# Patient Record
Sex: FEMALE | Race: WHITE | NOT HISPANIC OR LATINO | URBAN - METROPOLITAN AREA
[De-identification: names, ages, dates, MRNs, and addresses within clinical notes are randomized per-mention and may not be internally consistent; named-entity substitution may affect disease eponyms.]

---

## 2020-12-26 ENCOUNTER — INPATIENT (INPATIENT)
Facility: HOSPITAL | Age: 70
LOS: 4 days | Discharge: HOME HEALTH | End: 2020-12-31
Attending: INTERNAL MEDICINE | Admitting: INTERNAL MEDICINE
Payer: MEDICARE

## 2020-12-26 VITALS
SYSTOLIC BLOOD PRESSURE: 194 MMHG | RESPIRATION RATE: 18 BRPM | TEMPERATURE: 98 F | HEART RATE: 156 BPM | DIASTOLIC BLOOD PRESSURE: 100 MMHG | OXYGEN SATURATION: 99 %

## 2020-12-26 DIAGNOSIS — Z98.890 OTHER SPECIFIED POSTPROCEDURAL STATES: Chronic | ICD-10-CM

## 2020-12-26 LAB
ALBUMIN SERPL ELPH-MCNC: 4 G/DL — SIGNIFICANT CHANGE UP (ref 3.5–5.2)
ALP SERPL-CCNC: 75 U/L — SIGNIFICANT CHANGE UP (ref 30–115)
ALT FLD-CCNC: 10 U/L — SIGNIFICANT CHANGE UP (ref 0–41)
ANION GAP SERPL CALC-SCNC: 10 MMOL/L — SIGNIFICANT CHANGE UP (ref 7–14)
AST SERPL-CCNC: 12 U/L — SIGNIFICANT CHANGE UP (ref 0–41)
BASOPHILS # BLD AUTO: 0.06 K/UL — SIGNIFICANT CHANGE UP (ref 0–0.2)
BASOPHILS NFR BLD AUTO: 0.8 % — SIGNIFICANT CHANGE UP (ref 0–1)
BILIRUB SERPL-MCNC: 0.3 MG/DL — SIGNIFICANT CHANGE UP (ref 0.2–1.2)
BUN SERPL-MCNC: 12 MG/DL — SIGNIFICANT CHANGE UP (ref 10–20)
CALCIUM SERPL-MCNC: 10.5 MG/DL — HIGH (ref 8.5–10.1)
CHLORIDE SERPL-SCNC: 101 MMOL/L — SIGNIFICANT CHANGE UP (ref 98–110)
CO2 SERPL-SCNC: 27 MMOL/L — SIGNIFICANT CHANGE UP (ref 17–32)
CREAT SERPL-MCNC: 0.7 MG/DL — SIGNIFICANT CHANGE UP (ref 0.7–1.5)
EOSINOPHIL # BLD AUTO: 0.08 K/UL — SIGNIFICANT CHANGE UP (ref 0–0.7)
EOSINOPHIL NFR BLD AUTO: 1.1 % — SIGNIFICANT CHANGE UP (ref 0–8)
GLUCOSE SERPL-MCNC: 132 MG/DL — HIGH (ref 70–99)
HCT VFR BLD CALC: 44.6 % — SIGNIFICANT CHANGE UP (ref 37–47)
HGB BLD-MCNC: 14.1 G/DL — SIGNIFICANT CHANGE UP (ref 12–16)
IMM GRANULOCYTES NFR BLD AUTO: 0.3 % — SIGNIFICANT CHANGE UP (ref 0.1–0.3)
LYMPHOCYTES # BLD AUTO: 1.37 K/UL — SIGNIFICANT CHANGE UP (ref 1.2–3.4)
LYMPHOCYTES # BLD AUTO: 18.5 % — LOW (ref 20.5–51.1)
MAGNESIUM SERPL-MCNC: 1.8 MG/DL — SIGNIFICANT CHANGE UP (ref 1.8–2.4)
MCHC RBC-ENTMCNC: 27.8 PG — SIGNIFICANT CHANGE UP (ref 27–31)
MCHC RBC-ENTMCNC: 31.6 G/DL — LOW (ref 32–37)
MCV RBC AUTO: 88 FL — SIGNIFICANT CHANGE UP (ref 81–99)
MONOCYTES # BLD AUTO: 0.63 K/UL — HIGH (ref 0.1–0.6)
MONOCYTES NFR BLD AUTO: 8.5 % — SIGNIFICANT CHANGE UP (ref 1.7–9.3)
NEUTROPHILS # BLD AUTO: 5.25 K/UL — SIGNIFICANT CHANGE UP (ref 1.4–6.5)
NEUTROPHILS NFR BLD AUTO: 70.8 % — SIGNIFICANT CHANGE UP (ref 42.2–75.2)
NRBC # BLD: 0 /100 WBCS — SIGNIFICANT CHANGE UP (ref 0–0)
NT-PROBNP SERPL-SCNC: 4388 PG/ML — HIGH (ref 0–300)
PLATELET # BLD AUTO: 288 K/UL — SIGNIFICANT CHANGE UP (ref 130–400)
POTASSIUM SERPL-MCNC: 4.7 MMOL/L — SIGNIFICANT CHANGE UP (ref 3.5–5)
POTASSIUM SERPL-SCNC: 4.7 MMOL/L — SIGNIFICANT CHANGE UP (ref 3.5–5)
PROT SERPL-MCNC: 6.6 G/DL — SIGNIFICANT CHANGE UP (ref 6–8)
RBC # BLD: 5.07 M/UL — SIGNIFICANT CHANGE UP (ref 4.2–5.4)
RBC # FLD: 13.3 % — SIGNIFICANT CHANGE UP (ref 11.5–14.5)
SARS-COV-2 RNA SPEC QL NAA+PROBE: SIGNIFICANT CHANGE UP
SODIUM SERPL-SCNC: 138 MMOL/L — SIGNIFICANT CHANGE UP (ref 135–146)
TROPONIN T SERPL-MCNC: <0.01 NG/ML — SIGNIFICANT CHANGE UP
WBC # BLD: 7.41 K/UL — SIGNIFICANT CHANGE UP (ref 4.8–10.8)
WBC # FLD AUTO: 7.41 K/UL — SIGNIFICANT CHANGE UP (ref 4.8–10.8)

## 2020-12-26 PROCEDURE — 71045 X-RAY EXAM CHEST 1 VIEW: CPT | Mod: 26

## 2020-12-26 PROCEDURE — 99291 CRITICAL CARE FIRST HOUR: CPT | Mod: CS,GC

## 2020-12-26 PROCEDURE — 93010 ELECTROCARDIOGRAM REPORT: CPT

## 2020-12-26 RX ORDER — DILTIAZEM HCL 120 MG
60 CAPSULE, EXT RELEASE 24 HR ORAL ONCE
Refills: 0 | Status: COMPLETED | OUTPATIENT
Start: 2020-12-26 | End: 2020-12-26

## 2020-12-26 RX ORDER — CHLORHEXIDINE GLUCONATE 213 G/1000ML
1 SOLUTION TOPICAL
Refills: 0 | Status: DISCONTINUED | OUTPATIENT
Start: 2020-12-26 | End: 2020-12-31

## 2020-12-26 RX ORDER — DILTIAZEM HCL 120 MG
20 CAPSULE, EXT RELEASE 24 HR ORAL ONCE
Refills: 0 | Status: COMPLETED | OUTPATIENT
Start: 2020-12-26 | End: 2020-12-26

## 2020-12-26 RX ORDER — SODIUM CHLORIDE 9 MG/ML
1000 INJECTION INTRAMUSCULAR; INTRAVENOUS; SUBCUTANEOUS
Refills: 0 | Status: DISCONTINUED | OUTPATIENT
Start: 2020-12-26 | End: 2020-12-28

## 2020-12-26 RX ORDER — DILTIAZEM HCL 120 MG
10 CAPSULE, EXT RELEASE 24 HR ORAL
Qty: 125 | Refills: 0 | Status: DISCONTINUED | OUTPATIENT
Start: 2020-12-26 | End: 2020-12-27

## 2020-12-26 RX ORDER — DILTIAZEM HCL 120 MG
5 CAPSULE, EXT RELEASE 24 HR ORAL
Qty: 125 | Refills: 0 | Status: DISCONTINUED | OUTPATIENT
Start: 2020-12-26 | End: 2020-12-26

## 2020-12-26 RX ORDER — ENOXAPARIN SODIUM 100 MG/ML
40 INJECTION SUBCUTANEOUS DAILY
Refills: 0 | Status: DISCONTINUED | OUTPATIENT
Start: 2020-12-26 | End: 2020-12-27

## 2020-12-26 RX ORDER — INFLUENZA VIRUS VACCINE 15; 15; 15; 15 UG/.5ML; UG/.5ML; UG/.5ML; UG/.5ML
0.5 SUSPENSION INTRAMUSCULAR ONCE
Refills: 0 | Status: COMPLETED | OUTPATIENT
Start: 2020-12-26 | End: 2020-12-26

## 2020-12-26 RX ADMIN — Medication 5 MG/HR: at 15:10

## 2020-12-26 RX ADMIN — Medication 60 MILLIGRAM(S): at 13:33

## 2020-12-26 RX ADMIN — Medication 20 MILLIGRAM(S): at 13:05

## 2020-12-26 RX ADMIN — SODIUM CHLORIDE 75 MILLILITER(S): 9 INJECTION INTRAMUSCULAR; INTRAVENOUS; SUBCUTANEOUS at 23:22

## 2020-12-26 RX ADMIN — Medication 10 MG/HR: at 22:52

## 2020-12-26 RX ADMIN — Medication 20 MILLIGRAM(S): at 15:09

## 2020-12-26 NOTE — ED PROVIDER NOTE - OBJECTIVE STATEMENT
71 y/o F no reported pmh [place] [facility] [place] R[place]. + non bloody, intermittent, daily diarrhea x 2wks. + intermittent palpitations x past few days. went to [facility] [place]. No cp, pain, vomiting, fever. + hx prior episode afib rvr w/ electrocardioversion years ago, no ac or other meds rx'ed.

## 2020-12-26 NOTE — H&P ADULT - ASSESSMENT
71y/o F with PMH of Afib s/p ablation presents to ED with palpitations found to be in Afib.    #Atrial fibrillation with RVR  - on cardizem @ 5  - CHADSVASC 1, no therapeutic coag  - check TSH  - EP eval     #Chronic diarrhea  -     #MISC  - DVT ppx: lovenox  - GI ppx: not indicated  - Activity: as tolerated  - Diet: regular  - Dispo: acute 71y/o F with PMH of Afib s/p ablation presents to ED with palpitations found to be in Afib.    #Atrial fibrillation with RVR  - on cardizem @ 5  - CHADSVASC 1, no therapeutic coag  - check TSH  - EP eval     #diarrhea >1mo  - resolves with constant use of pepto-bismol  - GI PCR, GI culture/pH  - GI eval    #MISC  - DVT ppx: lovenox  - GI ppx: not indicated  - Activity: as tolerated  - Diet: regular  - Dispo: acute

## 2020-12-26 NOTE — ED ADULT NURSE NOTE - OBJECTIVE STATEMENT
Patient presents to ED with complaints of palpitations. Patient reports having diarrhea for last 2 weeks and started having palpitations a few days ago. Patient went to an urgent care today where they found her to be in Afib RVR. Patient denies CP, SOB, cough, fever, any sick contacts. Patient got Rapid COVID test at urgent care which was negative.

## 2020-12-26 NOTE — H&P ADULT - HISTORY OF PRESENT ILLNESS
69y/o F with PMH of Afib with hx of ablation not on AC presents with palpitations and diarrhea. Pt admits to having daily diarrhea for >2wks and some intermittent palpitations for past few days. She went to Urgent Care for diarrhea and was found to be in Afib with RVR. No CP, SOB, N/V, abd pain. Not on any other meds.    In ED: T98,  > 84, /88, RR16, SpO2 96%.   EKG Afib with RVR. Was given IV cardizem pushes w/o improvement, placed on cardizem drip.  Labs only remarkable for BNP 4388. COVID neg.  69y/o F with PMH of Afib with hx of ablation not on AC presents with palpitations and diarrhea. Pt admits to having daily diarrhea for >2wks and on questioning thinks she had some intermittent palpitations for past few days. She went to Urgent Care for diarrhea and was found incidentally to be in Afib with RVR. No CP, SOB, N/V, abd pain. Not on any other meds.     In ED: T98,  > 84, /88, RR16, SpO2 96%.   EKG Afib with RVR. Was given IV cardizem pushes w/o improvement, placed on cardizem drip.  Labs only remarkable for BNP 4388. COVID neg.  71y/o F with PMH of Afib with hx of ablation not on AC presents with palpitations and diarrhea. Pt admits to having daily diarrhea for >3wks and on questioning thinks she had some intermittent palpitations for past few days. She takes pepto-bismol "all day" to prevent the diarrhea - tried imodium but w/o success. She went to Urgent Care for diarrhea and was found incidentally to be in Afib with RVR. No CP, SOB, N/V, abd pain. Not on any other meds.     In ED: T98,  > 84, /88, RR16, SpO2 96%.   EKG Afib with RVR. Was given IV cardizem pushes w/o improvement, placed on cardizem drip.  Labs only remarkable for BNP 4388. COVID neg.

## 2020-12-26 NOTE — PATIENT PROFILE ADULT - NSPROPTRIGHTSUPPORTPERSON_GEN_A_NUR
Per Dr. Ho, patient can be added in earlier. Called patient and scheduled her for 9/23/19, advised her to bring in her discharge paperwork. Patient verbalized understanding.   yes

## 2020-12-26 NOTE — H&P ADULT - NSHPPHYSICALEXAM_GEN_ALL_CORE
GENERAL: NAD, well-developed  HEENT:  Atraumatic, Normocephalic; EOMI, PERRLA, conjunctiva pink, sclera white, Supple, No JVD, thyromegally or LYAD appreciated  CHEST/LUNG: Clear to auscultation bilaterally; No wheeze, ronchi or rales  HEART: Regular rate and rhythm; No murmurs, rubs, or gallops  ABDOMEN: Soft, Nontender, Nondistended; Bowel sounds present  EXTREMITIES:  2+ Peripheral Pulses, No peripheral pitting edema  PSYCH: AAOx3  NEUROLOGY: non-focal  SKIN: No rashes/lesions appreciated GENERAL: NAD, well-developed F comfortable in bed  HEENT:  Atraumatic, Normocephalic; EOMI, PERRLA, conjunctiva pink, sclera white, Supple, dry oral mucosa  CHEST/LUNG: Clear to auscultation bilaterally; No wheeze  HEART: irregular rhythm, rate ~110; No murmurs  ABDOMEN: Soft, Nontender, Nondistended; Bowel sounds normoactive  EXTREMITIES:  2+ Peripheral Pulses, No peripheral pitting edema  PSYCH: AAOx3  NEUROLOGY: non-focal  SKIN: No rashes/lesions appreciated

## 2020-12-26 NOTE — ED PROVIDER NOTE - NS ED ROS FT
Constitutional:  No fever  Eyes:  No visual changes  ENMT: No neck pain or stiffness  Cardiac:  No chest pain; See HPI  Respiratory:  No cough or respiratory distress.   GI:  See HPI  :  No dysuria, frequency or burning.  MS:  No back pain.  Neuro:  No headache   Skin:  No skin rash  Except as documented in the HPI,  all other systems are negative

## 2020-12-26 NOTE — ED PROVIDER NOTE - ATTENDING CONTRIBUTION TO CARE
.  IMP: afib RVR, diarrhea  [place] [facility] [place]  P: CCBlocker rate control, ivf, cxr, ekg, labs, reassess, plan to admit.

## 2020-12-26 NOTE — ED PROVIDER NOTE - CLINICAL SUMMARY MEDICAL DECISION MAKING FREE TEXT BOX
Pt w/ AFIB RVR and diarrhea. Labs reviewed. Rate controlled w/ CCB. pt got IVF. EKG Aflutter. Pt admitted to tele for cardiac monitoring, cont IV rate control, specialist consult.

## 2020-12-26 NOTE — ED PROVIDER NOTE - PHYSICAL EXAMINATION
CONSTITUTIONAL: NAD  SKIN: Warm dry  HEAD: NCAT  EYES: NL inspection  ENT: MMM  NECK: Supple; non tender.  CARD: irreg irreg tachycardia 160-170's  RESP: CTAB  ABD: S/NT no R/G  EXT: no pedal edema  NEURO: Grossly unremarkable  PSYCH: Cooperative, appropriate.

## 2020-12-27 LAB
ANION GAP SERPL CALC-SCNC: 10 MMOL/L — SIGNIFICANT CHANGE UP (ref 7–14)
BASOPHILS # BLD AUTO: 0.06 K/UL — SIGNIFICANT CHANGE UP (ref 0–0.2)
BASOPHILS NFR BLD AUTO: 0.9 % — SIGNIFICANT CHANGE UP (ref 0–1)
BUN SERPL-MCNC: 9 MG/DL — LOW (ref 10–20)
CALCIUM SERPL-MCNC: 10 MG/DL — SIGNIFICANT CHANGE UP (ref 8.5–10.1)
CHLORIDE SERPL-SCNC: 103 MMOL/L — SIGNIFICANT CHANGE UP (ref 98–110)
CO2 SERPL-SCNC: 25 MMOL/L — SIGNIFICANT CHANGE UP (ref 17–32)
CREAT SERPL-MCNC: 0.5 MG/DL — LOW (ref 0.7–1.5)
EOSINOPHIL # BLD AUTO: 0.21 K/UL — SIGNIFICANT CHANGE UP (ref 0–0.7)
EOSINOPHIL NFR BLD AUTO: 3 % — SIGNIFICANT CHANGE UP (ref 0–8)
GLUCOSE SERPL-MCNC: 128 MG/DL — HIGH (ref 70–99)
HCT VFR BLD CALC: 42 % — SIGNIFICANT CHANGE UP (ref 37–47)
HGB BLD-MCNC: 12.8 G/DL — SIGNIFICANT CHANGE UP (ref 12–16)
IMM GRANULOCYTES NFR BLD AUTO: 0.3 % — SIGNIFICANT CHANGE UP (ref 0.1–0.3)
LYMPHOCYTES # BLD AUTO: 1.5 K/UL — SIGNIFICANT CHANGE UP (ref 1.2–3.4)
LYMPHOCYTES # BLD AUTO: 21.3 % — SIGNIFICANT CHANGE UP (ref 20.5–51.1)
MAGNESIUM SERPL-MCNC: 1.9 MG/DL — SIGNIFICANT CHANGE UP (ref 1.8–2.4)
MCHC RBC-ENTMCNC: 26.9 PG — LOW (ref 27–31)
MCHC RBC-ENTMCNC: 30.5 G/DL — LOW (ref 32–37)
MCV RBC AUTO: 88.4 FL — SIGNIFICANT CHANGE UP (ref 81–99)
MONOCYTES # BLD AUTO: 0.7 K/UL — HIGH (ref 0.1–0.6)
MONOCYTES NFR BLD AUTO: 9.9 % — HIGH (ref 1.7–9.3)
NEUTROPHILS # BLD AUTO: 4.56 K/UL — SIGNIFICANT CHANGE UP (ref 1.4–6.5)
NEUTROPHILS NFR BLD AUTO: 64.6 % — SIGNIFICANT CHANGE UP (ref 42.2–75.2)
NRBC # BLD: 0 /100 WBCS — SIGNIFICANT CHANGE UP (ref 0–0)
PLATELET # BLD AUTO: 271 K/UL — SIGNIFICANT CHANGE UP (ref 130–400)
POTASSIUM SERPL-MCNC: 4.8 MMOL/L — SIGNIFICANT CHANGE UP (ref 3.5–5)
POTASSIUM SERPL-SCNC: 4.8 MMOL/L — SIGNIFICANT CHANGE UP (ref 3.5–5)
RBC # BLD: 4.75 M/UL — SIGNIFICANT CHANGE UP (ref 4.2–5.4)
RBC # FLD: 13.2 % — SIGNIFICANT CHANGE UP (ref 11.5–14.5)
SODIUM SERPL-SCNC: 138 MMOL/L — SIGNIFICANT CHANGE UP (ref 135–146)
WBC # BLD: 7.05 K/UL — SIGNIFICANT CHANGE UP (ref 4.8–10.8)
WBC # FLD AUTO: 7.05 K/UL — SIGNIFICANT CHANGE UP (ref 4.8–10.8)

## 2020-12-27 PROCEDURE — 99223 1ST HOSP IP/OBS HIGH 75: CPT

## 2020-12-27 PROCEDURE — 93306 TTE W/DOPPLER COMPLETE: CPT | Mod: 26

## 2020-12-27 PROCEDURE — 99233 SBSQ HOSP IP/OBS HIGH 50: CPT

## 2020-12-27 PROCEDURE — 99222 1ST HOSP IP/OBS MODERATE 55: CPT | Mod: GC

## 2020-12-27 PROCEDURE — 93291 INTERROG DEV EVAL SCRMS IP: CPT | Mod: 26

## 2020-12-27 RX ORDER — DILTIAZEM HCL 120 MG
60 CAPSULE, EXT RELEASE 24 HR ORAL EVERY 6 HOURS
Refills: 0 | Status: DISCONTINUED | OUTPATIENT
Start: 2020-12-27 | End: 2020-12-30

## 2020-12-27 RX ORDER — APIXABAN 2.5 MG/1
5 TABLET, FILM COATED ORAL EVERY 12 HOURS
Refills: 0 | Status: DISCONTINUED | OUTPATIENT
Start: 2020-12-27 | End: 2020-12-29

## 2020-12-27 RX ADMIN — APIXABAN 5 MILLIGRAM(S): 2.5 TABLET, FILM COATED ORAL at 12:28

## 2020-12-27 RX ADMIN — APIXABAN 5 MILLIGRAM(S): 2.5 TABLET, FILM COATED ORAL at 23:45

## 2020-12-27 RX ADMIN — SODIUM CHLORIDE 100 MILLILITER(S): 9 INJECTION INTRAMUSCULAR; INTRAVENOUS; SUBCUTANEOUS at 12:28

## 2020-12-27 RX ADMIN — Medication 60 MILLIGRAM(S): at 23:44

## 2020-12-27 RX ADMIN — Medication 60 MILLIGRAM(S): at 11:00

## 2020-12-27 RX ADMIN — Medication 60 MILLIGRAM(S): at 17:23

## 2020-12-27 RX ADMIN — CHLORHEXIDINE GLUCONATE 1 APPLICATION(S): 213 SOLUTION TOPICAL at 05:11

## 2020-12-27 NOTE — CONSULT NOTE ADULT - ASSESSMENT
71yo Female with PAF, DCCV in the past admitted with AF RVR and diarrhea  diarrhea resolved   AF onset likely due to dehydration in the setting of persistent diarrhea     PAF  [id number] Score is 1    con't tele  will plan DCCV for tomorrow  NPO after MN  start Eliquis 5mg bid, will need therapeutic AC after DCCV despite low score  change Diltiazem drip to 60mg PO q6h, with plan to change to long acting on discharge  Check TSH / Free T4   check Lyme titers   Echocardiogram  PO and IV rehydration  will follow    D/w Dr Watt

## 2020-12-27 NOTE — PROGRESS NOTE ADULT - SUBJECTIVE AND OBJECTIVE BOX
Pt was seen and examined. Pt resting comfortably in bed. no complaints at this time.    ROS: All negative except as noted above    PAST MEDICAL & SURGICAL HISTORY:  Afib    Allergies    No Known Allergies    Intolerances    MEDICATIONS  (STANDING):  apixaban 5 milliGRAM(s) Oral every 12 hours  chlorhexidine 4% Liquid 1 Application(s) Topical <User Schedule>  diltiazem    Tablet 60 milliGRAM(s) Oral every 6 hours  sodium chloride 0.9%. 1000 milliLiter(s) (100 mL/Hr) IV Continuous <Continuous>    MEDICATIONS  (PRN):    Vital Signs Last 24 Hrs  T(C): 36.7 (27 Dec 2020 20:06), Max: 36.7 (27 Dec 2020 20:06)  T(F): 98.1 (27 Dec 2020 20:06), Max: 98.1 (27 Dec 2020 20:06)  HR: 122 (27 Dec 2020 20:06) (77 - 122)  BP: 111/79 (27 Dec 2020 20:06) (106/69 - 137/84)  BP(mean): --  RR: 18 (27 Dec 2020 20:06) (18 - 19)  SpO2: 95% (27 Dec 2020 19:52) (90% - 95%)    Physical Exam:  Constitutional: Not in acute distress  SKIN: No rashes or lesions  HEENT: Atraumatic. Normocephalic. Anicteric  NECK:  No JVD.   PULMONARY: Clear Auscultation bilateraly. non labored respirations.   Cardiovascular: Normal S1, S2. Irregular rhythm. No murmurs.  ABDOMEN: Soft, Nontender, Nondistended; Bowel sounds are present  EXTREMITIES:  Non edematous, non cyanotic  NEUROLOGIC:  Alert & oriented x 3, No motor deficit.  PSYCH: normal affect    Labs:  CBC                        12.8   7.05  )-----------( 271      ( 27 Dec 2020 06:15 )             42.0   CMP  12-27    138  |  103  |  9<L>  ----------------------------<  128<H>  4.8   |  25  |  0.5<L>    Ca    10.0      27 Dec 2020 06:15  Mg     1.9     12-27    TPro  6.6  /  Alb  4.0  /  TBili  0.3  /  DBili  x   /  AST  12  /  ALT  10  /  AlkPhos  75  12-26    < from: Xray Chest 1 View-PORTABLE IMMEDIATE (12.26.20 @ 14:35) >  Impression:    1. Low lung volumes with small bibasilar opacities and/or pleural effusions.EXAM:  XR CHEST PORTABLE IMMED 1V            PROCEDURE DATE:  12/26/2020      < end of copied text >

## 2020-12-27 NOTE — CONSULT NOTE ADULT - ASSESSMENT
71y/o F with PMH of Afib with hx of ablation not on AC presents with palpitations and diarrhea. GI consulted for diarrhea. Non bloody , non mucoid , of two weeks duration , second episode sine January  this year . No abdominal pain , no weight loss , no GI bleed     #acute diarrhea ( two weeks in duration) without alarm symptoms   Diarrhea is resolved   No anemia     REC:  Stool studies for C.DIFF and GI PCR , lactoferrin and calprotectin if diarrhea reoccur   Lactose free diet   Out patient colonoscopy   please recall GI for any question or concern

## 2020-12-27 NOTE — CONSULT NOTE ADULT - SUBJECTIVE AND OBJECTIVE BOX
Gastroenterology Consultation:    Patient is a 70y old  Female who presents with a chief complaint of Afib (27 Dec 2020 09:31)      Admitted on: 12-26-20  HPI:  71y/o F with PMH of Afib with hx of ablation not on AC presents with palpitations and diarrhea. Pt admits to having daily diarrhea for 2 weeks , two to three episodes per  day , non bloody , non mucoid , with no abdominal pain , no fever or chills ,   and on questioning thinks she had some intermittent palpitations for past few days. She takes pepto-bismol "all day" to prevent the diarrhea - tried imodium but w/o success. She went to Urgent Care for diarrhea and was found incidentally to be in Afib with RVR. No CP, SOB, N/V, abd pain. Not on any other meds. Patient had one week of diarrhea back in January last year ad resolved on its own     In ED: T98,  > 84, /88, RR16, SpO2 96%.   EKG Afib with RVR. Was given IV Cardizem pushes w/o improvement, placed on Cardizem drip.  Labs only remarkable for BNP 4388. COVID neg.  (26 Dec 2020 16:12)      Prior records Reviewed (Y/N): Y  History obtained from person other than patient (Y/N): Y    Prior EGD: None in chart   Prior Colonoscopy: more than 5 years ago       PAST MEDICAL & SURGICAL HISTORY:  Afib        FAMILY HISTORY:  FH: peptic ulcer  mother        Social History:  Tobacco: denies   Alcohol: denies   Drugs: denies     Home Medications:    MEDICATIONS  (STANDING):  apixaban 5 milliGRAM(s) Oral every 12 hours  chlorhexidine 4% Liquid 1 Application(s) Topical <User Schedule>  diltiazem    Tablet 60 milliGRAM(s) Oral every 6 hours  diltiazem Infusion 10 mG/Hr (10 mL/Hr) IV Continuous <Continuous>  sodium chloride 0.9%. 1000 milliLiter(s) (100 mL/Hr) IV Continuous <Continuous>    MEDICATIONS  (PRN):      Allergies  No Known Allergies      Review of Systems:   Constitutional:  No Fever, No Chills  ENT/Mouth:  No Hearing Changes,  No Difficulty Swallowing  Eyes:  No Eye Pain, No Vision Changes  Cardiovascular:  No Chest Pain, No Palpitations now   Respiratory:  No Cough, No Dyspnea  Gastrointestinal:  As described in HPI  Musculoskeletal:  No Joint Swelling, No Back Pain  Skin:  No Skin Lesions, No Jaundice  Neuro:  No Syncope, No Dizziness  Heme/Lymph:  No Bruising, No Bleeding.          Physical Examination:  T(C): 35.9 (12-27-20 @ 12:32), Max: 37 (12-26-20 @ 18:49)  HR: 88 (12-27-20 @ 12:32) (77 - 120)  BP: 123/85 (12-27-20 @ 12:32) (106/69 - 167/88)  RR: 18 (12-27-20 @ 12:32) (16 - 19)  SpO2: 94% (12-27-20 @ 07:59) (92% - 96%)    Weight (kg): 85.5 (12-27-20 @ 10:00)    12-26-20 @ 07:01  -  12-27-20 @ 07:00  --------------------------------------------------------  IN: 1210 mL / OUT: 0 mL / NET: 1210 mL    12-27-20 @ 07:01  -  12-27-20 @ 14:26  --------------------------------------------------------  IN: 480 mL / OUT: 0 mL / NET: 480 mL        Constitutional: No acute distress.  Eyes:. Conjunctivae are clear, Sclera is non-icteric.  Ears Nose and Throat: The external ears are normal appearing,  Oral mucosa is pink and moist.  Respiratory:  No signs of respiratory distress. Lung sounds are clear bilaterally.  Cardiovascular:  S1 S2, Regular rate and rhythm.  GI: Abdomen is soft, symmetric, and non-tender without distention. Bowel sounds are present and normoactive in all four quadrants. No masses, hepatomegaly, or splenomegaly are noted.   Neuro: No Tremor, No involuntary movements  Skin: No rashes, No Jaundice.          Data: (reviewed by attending)                        12.8   7.05  )-----------( 271      ( 27 Dec 2020 06:15 )             42.0     Hgb Trend:  12.8  12-27-20 @ 06:15  14.1  12-26-20 @ 12:56        12-27    138  |  103  |  9<L>  ----------------------------<  128<H>  4.8   |  25  |  0.5<L>    Ca    10.0      27 Dec 2020 06:15  Mg     1.9     12-27    TPro  6.6  /  Alb  4.0  /  TBili  0.3  /  DBili  x   /  AST  12  /  ALT  10  /  AlkPhos  75  12-26    Liver panel trend:  TBili 0.3   /   AST 12   /   ALT 10   /   AlkP 75   /   Tptn 6.6   /   Alb 4.0    /   DBili --      12-26              Radiology:(reviewed by attending)

## 2020-12-27 NOTE — PROGRESS NOTE ADULT - ASSESSMENT
Ms Herndon is a 71yo Woman with medical history of Afib s/p ablation presents to ED with palpitations found to be in Afib.    #Atrial fibrillation with RVR  CHADSVASc of 2 - High Risk  f/u TSH and free T4  f/u Echo  f/u lyme titers?  EP eval noted - DCCV david   started Eliquis 5mg q12  started Cardizem 60mg q6      #diarrhea >1mo  resolves with constant use of pepto-bismol  GI PCR, GI culture/pH  GI eval  f/u TSH     Dispo: Acute   Full Code

## 2020-12-27 NOTE — CONSULT NOTE ADULT - SUBJECTIVE AND OBJECTIVE BOX
Patient is a 70y old  Female who presents with a chief complaint of Afib (26 Dec 2020 16:12)        HPI:  71y/o F with PMH of Afib with hx of ablation not on AC presents with palpitations and diarrhea. Pt admits to having daily diarrhea for >3wks and on questioning thinks she had some intermittent palpitations for past few days. She takes pepto-bismol "all day" to prevent the diarrhea - tried imodium but w/o success. She went to Urgent Care for diarrhea and was found incidentally to be in Afib with RVR. No CP, SOB, N/V, abd pain. Not on any other meds.     In ED: T98,  > 84, /88, RR16, SpO2 96%.   EKG Afib with RVR. Was given IV cardizem pushes w/o improvement, placed on cardizem drip.  Labs only remarkable for BNP 4388. COVID neg.  (26 Dec 2020 16:12)      Electrophysiology:  70y Female with family h/o CAD and AF, with h/o one known episode of AF 3/2018 in the setting of PNA, s/p DCCV at that time. Implantable loop recorder was placed then, however patient did not follow up with monitoring. Patient was in usual state of health, moved to  ~6mos ago. Since 2 weeks ago she developed persistent watery diarrhea improved with pepto-bismos, recur once pepto is stopped. She presented to [facility] with above complaint and was found to be in AF RVR 160s bpm and sent to ED. In ED she received Diltiazem 10mg IVP followed by drip, @10mg at this time. Patient report occasional palpitations/"hearth thumping"   over the last two weeks, denies dyspnea, dizziness, chest pain, LOC, reports generalized weakness. No diarrhea or any BP since admission  ILR was interrogated. Since last interrogation 3/20/2018 patient had several episodes of AF with RVR in July and 2020 lasting several days, This episode is ongoing since 2020.  -170s bpm  Patient does not recall any special symptoms or stressors at that times, no recent travels or new medications. No cardiac evals in the past      REVIEW OF SYSTEMS    [x ] A ten-point review of systems was otherwise negative except as noted.  [ ] Due to altered mental status/intubation, subjective information were not able to be obtained from the patient. History was obtained, to the extent possible, from review of the chart and collateral sources of information.      PAST MEDICAL & SURGICAL HISTORY:  Afib    DCCV        Home Medications:  NONE    Allergies:  No Known Allergies      FAMILY HISTORY:  FH: peptic ulcer  mother - AF, CAD  father - CAD, MI           SOCIAL HISTORY: denies tobacco / ETOH / illicit drug use      CIGARETTES:  ALCOHOL:        PREVIOUS DIAGNOSTIC TESTING:  none available          MEDICATIONS  (STANDING):  apixaban 5 milliGRAM(s) Oral every 12 hours  chlorhexidine 4% Liquid 1 Application(s) Topical <User Schedule>  diltiazem    Tablet 60 milliGRAM(s) Oral every 6 hours  diltiazem Infusion 10 mG/Hr (10 mL/Hr) IV Continuous <Continuous>  sodium chloride 0.9%. 1000 milliLiter(s) (100 mL/Hr) IV Continuous <Continuous>    MEDICATIONS  (PRN):      Vital Signs Last 24 Hrs  T(C): 35.7 (27 Dec 2020 06:30), Max: 37 (26 Dec 2020 18:49)  T(F): 96.3 (27 Dec 2020 06:30), Max: 98.6 (26 Dec 2020 18:49)  HR: 102 (27 Dec 2020 10:00) (77 - 160)  BP: 123/83 (27 Dec 2020 10:00) (106/69 - 194/100)  BP(mean): --  RR: 19 (27 Dec 2020 06:30) (16 - 19)  SpO2: 94% (27 Dec 2020 07:59) (92% - 99%)    PHYSICAL EXAM:    GENERAL: In no apparent distress, well nourished, and hydrated.  HEAD:  Atraumatic, Normocephalic  EYES: EOMI, PERRLA, conjunctiva and sclera clear  NECK: Supple and normal thyroid.  No JVD or carotid bruit.  Carotid pulse is 2+ bilaterally.  HEART: Tachy IRRegular rate and rhythm; No murmurs, rubs, or gallops.  PULMONARY: Clear to auscultation and perfusion.  No rales, wheezing, or rhonchi bilaterally.  ABDOMEN: Soft, Nontender, Nondistended; Bowel sounds present  EXTREMITIES:  2+ Peripheral Pulses, No clubbing, cyanosis, or edema  NEUROLOGICAL: Grossly nonfocal    I&O's Detail    26 Dec 2020 07:01  -  27 Dec 2020 07:00  --------------------------------------------------------  IN:    Diltiazem: 15 mL    Diltiazem: 80 mL    Oral Fluid: 440 mL    sodium chloride 0.9%: 675 mL  Total IN: 1210 mL    OUT:  Total OUT: 0 mL    Total NET: 1210 mL        Daily     Daily Weight in k.7 (27 Dec 2020 04:30)    INTERPRETATION OF TELEMETRY:    EC Lead ECG:   Ventricular Rate 156 BPM    QRS Duration 76 ms    Q-T Interval 278 ms    QTC Calculation(Bazett) 448 ms    R Axis 119 degrees    T Axis 46 degrees    Diagnosis Line Atrial fibrillation with rapid ventricular response  Right axis deviation  Low voltage QRS  Abnormal ECG    Confirmed by Seng Canseco ([id number]) on 2020 2:12:05 PM (20 @ 12:37)        LABS:                        12.8   7.05  )-----------( 271      ( 27 Dec 2020 06:15 )             42.0         138  |  103  |  9<L>  ----------------------------<  128<H>  4.8   |  25  |  0.5<L>    Ca    10.0      27 Dec 2020 06:15  Mg     1.9         TPro  6.6  /  Alb  4.0  /  TBili  0.3  /  DBili  x   /  AST  12  /  ALT  10  /  AlkPhos  75      CARDIAC MARKERS ( 26 Dec 2020 12:56 )  x     / <0.01 ng/mL / x     / x     / x              BNPSerum Pro-Brain Natriuretic Peptide: 4388 pg/mL ( @ 12:56)              RADIOLOGY & ADDITIONAL STUDIES:    < from: Xray Chest 1 View-PORTABLE IMMEDIATE (20 @ 14:35) >  Findings:    Support devices: Overlying EKG leads.    Cardiac/mediastinum/hilum: Cardiomegaly.    Lung parenchyma/Pleura: Low lung volumes with small bibasilar opacities and/or pleural effusions. No pneumothorax.    Skeleton/soft tissues: No acute osseous abnormality.    Impression:    1. Low lung volumes with small bibasilar opacities and/or pleural effusions.    < end of copied text >

## 2020-12-28 LAB
ALBUMIN SERPL ELPH-MCNC: 3.6 G/DL — SIGNIFICANT CHANGE UP (ref 3.5–5.2)
ALP SERPL-CCNC: 73 U/L — SIGNIFICANT CHANGE UP (ref 30–115)
ALT FLD-CCNC: 8 U/L — SIGNIFICANT CHANGE UP (ref 0–41)
ANION GAP SERPL CALC-SCNC: 10 MMOL/L — SIGNIFICANT CHANGE UP (ref 7–14)
APTT BLD: 34.7 SEC — SIGNIFICANT CHANGE UP (ref 27–39.2)
AST SERPL-CCNC: 10 U/L — SIGNIFICANT CHANGE UP (ref 0–41)
B BURGDOR C6 AB SER-ACNC: NEGATIVE — SIGNIFICANT CHANGE UP
B BURGDOR C6 AB SER-ACNC: NEGATIVE — SIGNIFICANT CHANGE UP
B BURGDOR IGG+IGM SER-ACNC: 0.06 INDEX — SIGNIFICANT CHANGE UP (ref 0.01–0.89)
B BURGDOR IGG+IGM SER-ACNC: 0.08 INDEX — SIGNIFICANT CHANGE UP (ref 0.01–0.89)
BASOPHILS # BLD AUTO: 0.07 K/UL — SIGNIFICANT CHANGE UP (ref 0–0.2)
BASOPHILS NFR BLD AUTO: 0.9 % — SIGNIFICANT CHANGE UP (ref 0–1)
BILIRUB SERPL-MCNC: 0.8 MG/DL — SIGNIFICANT CHANGE UP (ref 0.2–1.2)
BUN SERPL-MCNC: 9 MG/DL — LOW (ref 10–20)
CALCIUM SERPL-MCNC: 9.5 MG/DL — SIGNIFICANT CHANGE UP (ref 8.5–10.1)
CHLORIDE SERPL-SCNC: 101 MMOL/L — SIGNIFICANT CHANGE UP (ref 98–110)
CO2 SERPL-SCNC: 27 MMOL/L — SIGNIFICANT CHANGE UP (ref 17–32)
CREAT SERPL-MCNC: 0.5 MG/DL — LOW (ref 0.7–1.5)
EOSINOPHIL # BLD AUTO: 0.24 K/UL — SIGNIFICANT CHANGE UP (ref 0–0.7)
EOSINOPHIL NFR BLD AUTO: 3.1 % — SIGNIFICANT CHANGE UP (ref 0–8)
GLUCOSE SERPL-MCNC: 107 MG/DL — HIGH (ref 70–99)
HCT VFR BLD CALC: 42.5 % — SIGNIFICANT CHANGE UP (ref 37–47)
HCV AB S/CO SERPL IA: 0.03 COI — SIGNIFICANT CHANGE UP
HCV AB SERPL-IMP: SIGNIFICANT CHANGE UP
HGB BLD-MCNC: 13.5 G/DL — SIGNIFICANT CHANGE UP (ref 12–16)
IMM GRANULOCYTES NFR BLD AUTO: 0.3 % — SIGNIFICANT CHANGE UP (ref 0.1–0.3)
INR BLD: 1.47 RATIO — HIGH (ref 0.65–1.3)
LYMPHOCYTES # BLD AUTO: 1.65 K/UL — SIGNIFICANT CHANGE UP (ref 1.2–3.4)
LYMPHOCYTES # BLD AUTO: 21.6 % — SIGNIFICANT CHANGE UP (ref 20.5–51.1)
MAGNESIUM SERPL-MCNC: 1.9 MG/DL — SIGNIFICANT CHANGE UP (ref 1.8–2.4)
MCHC RBC-ENTMCNC: 28.1 PG — SIGNIFICANT CHANGE UP (ref 27–31)
MCHC RBC-ENTMCNC: 31.8 G/DL — LOW (ref 32–37)
MCV RBC AUTO: 88.4 FL — SIGNIFICANT CHANGE UP (ref 81–99)
MONOCYTES # BLD AUTO: 0.73 K/UL — HIGH (ref 0.1–0.6)
MONOCYTES NFR BLD AUTO: 9.6 % — HIGH (ref 1.7–9.3)
NEUTROPHILS # BLD AUTO: 4.93 K/UL — SIGNIFICANT CHANGE UP (ref 1.4–6.5)
NEUTROPHILS NFR BLD AUTO: 64.5 % — SIGNIFICANT CHANGE UP (ref 42.2–75.2)
NRBC # BLD: 0 /100 WBCS — SIGNIFICANT CHANGE UP (ref 0–0)
PLATELET # BLD AUTO: 286 K/UL — SIGNIFICANT CHANGE UP (ref 130–400)
POTASSIUM SERPL-MCNC: 4.6 MMOL/L — SIGNIFICANT CHANGE UP (ref 3.5–5)
POTASSIUM SERPL-SCNC: 4.6 MMOL/L — SIGNIFICANT CHANGE UP (ref 3.5–5)
PROT SERPL-MCNC: 6.4 G/DL — SIGNIFICANT CHANGE UP (ref 6–8)
PROTHROM AB SERPL-ACNC: 16.9 SEC — HIGH (ref 9.95–12.87)
RBC # BLD: 4.81 M/UL — SIGNIFICANT CHANGE UP (ref 4.2–5.4)
RBC # FLD: 13.3 % — SIGNIFICANT CHANGE UP (ref 11.5–14.5)
SODIUM SERPL-SCNC: 138 MMOL/L — SIGNIFICANT CHANGE UP (ref 135–146)
T4 FREE SERPL-MCNC: 1.3 NG/DL — SIGNIFICANT CHANGE UP (ref 0.9–1.8)
TSH SERPL-MCNC: 0.8 UIU/ML — SIGNIFICANT CHANGE UP (ref 0.27–4.2)
TSH SERPL-MCNC: 0.83 UIU/ML — SIGNIFICANT CHANGE UP (ref 0.27–4.2)
WBC # BLD: 7.64 K/UL — SIGNIFICANT CHANGE UP (ref 4.8–10.8)
WBC # FLD AUTO: 7.64 K/UL — SIGNIFICANT CHANGE UP (ref 4.8–10.8)

## 2020-12-28 PROCEDURE — 71045 X-RAY EXAM CHEST 1 VIEW: CPT | Mod: 26

## 2020-12-28 PROCEDURE — 93320 DOPPLER ECHO COMPLETE: CPT | Mod: 26

## 2020-12-28 PROCEDURE — 99233 SBSQ HOSP IP/OBS HIGH 50: CPT

## 2020-12-28 PROCEDURE — 93325 DOPPLER ECHO COLOR FLOW MAPG: CPT | Mod: 26

## 2020-12-28 PROCEDURE — 93312 ECHO TRANSESOPHAGEAL: CPT | Mod: 26,XU

## 2020-12-28 RX ORDER — FUROSEMIDE 40 MG
40 TABLET ORAL EVERY 12 HOURS
Refills: 0 | Status: DISCONTINUED | OUTPATIENT
Start: 2020-12-28 | End: 2020-12-31

## 2020-12-28 RX ORDER — AMIODARONE HYDROCHLORIDE 400 MG/1
150 TABLET ORAL ONCE
Refills: 0 | Status: COMPLETED | OUTPATIENT
Start: 2020-12-28 | End: 2020-12-28

## 2020-12-28 RX ORDER — AMIODARONE HYDROCHLORIDE 400 MG/1
1 TABLET ORAL
Qty: 900 | Refills: 0 | Status: DISCONTINUED | OUTPATIENT
Start: 2020-12-28 | End: 2020-12-29

## 2020-12-28 RX ORDER — FUROSEMIDE 40 MG
20 TABLET ORAL DAILY
Refills: 0 | Status: DISCONTINUED | OUTPATIENT
Start: 2020-12-28 | End: 2020-12-28

## 2020-12-28 RX ORDER — METOPROLOL TARTRATE 50 MG
25 TABLET ORAL
Refills: 0 | Status: DISCONTINUED | OUTPATIENT
Start: 2020-12-28 | End: 2020-12-30

## 2020-12-28 RX ORDER — AMIODARONE HYDROCHLORIDE 400 MG/1
0.5 TABLET ORAL
Qty: 900 | Refills: 0 | Status: DISCONTINUED | OUTPATIENT
Start: 2020-12-28 | End: 2020-12-30

## 2020-12-28 RX ORDER — AMIODARONE HYDROCHLORIDE 400 MG/1
200 TABLET ORAL
Refills: 0 | Status: DISCONTINUED | OUTPATIENT
Start: 2020-12-28 | End: 2020-12-28

## 2020-12-28 RX ADMIN — AMIODARONE HYDROCHLORIDE 600 MILLIGRAM(S): 400 TABLET ORAL at 18:32

## 2020-12-28 RX ADMIN — Medication 60 MILLIGRAM(S): at 05:24

## 2020-12-28 RX ADMIN — Medication 60 MILLIGRAM(S): at 11:24

## 2020-12-28 RX ADMIN — Medication 25 MILLIGRAM(S): at 17:55

## 2020-12-28 RX ADMIN — Medication 40 MILLIGRAM(S): at 10:16

## 2020-12-28 RX ADMIN — Medication 40 MILLIGRAM(S): at 18:33

## 2020-12-28 RX ADMIN — AMIODARONE HYDROCHLORIDE 33.3 MG/MIN: 400 TABLET ORAL at 18:48

## 2020-12-28 RX ADMIN — APIXABAN 5 MILLIGRAM(S): 2.5 TABLET, FILM COATED ORAL at 11:24

## 2020-12-28 RX ADMIN — APIXABAN 5 MILLIGRAM(S): 2.5 TABLET, FILM COATED ORAL at 23:04

## 2020-12-28 RX ADMIN — Medication 60 MILLIGRAM(S): at 23:04

## 2020-12-28 RX ADMIN — Medication 60 MILLIGRAM(S): at 17:55

## 2020-12-28 NOTE — PROGRESS NOTE ADULT - SUBJECTIVE AND OBJECTIVE BOX
TOYA MESA  70y Female    CHIEF COMPLAINT:    Patient is a 70y old  Female who presents with a chief complaint of Afib (27 Dec 2020 22:40)      INTERVAL HPI/OVERNIGHT EVENTS:    Patient seen and examined.    ROS: All other systems are negative.    Vital Signs:    T(F): 97.2 (20 @ 06:06), Max: 98.1 (20 @ 20:06)  HR: 123 (20 @ 06:06) (88 - 123)  BP: 125/73 (20 @ 06:06) (111/79 - 137/84)  RR: 19 (20 @ 06:06) (18 - 19)  SpO2: 95% (20 @ 19:52) (90% - 95%)  I&O's Summary    27 Dec 2020 07:01  -  28 Dec 2020 07:00  --------------------------------------------------------  IN: 2300 mL / OUT: 0 mL / NET: 2300 mL      Daily     Daily Weight in k.7 (28 Dec 2020 06:06)  CAPILLARY BLOOD GLUCOSE          PHYSICAL EXAM:    GENERAL:  NAD  SKIN: No rashes or lesions  HENT: Atrumatic. Normocephalic. PERRL. Moist membranes.  NECK: Supple, No JVD. No lymphadenopathy.  PULMONARY: CTA B/L. No wheezing. No rales  CVS: Normal S1, S2. Rate and Rythm are regular. No murmurs.  ABDOMEN/GI: Soft, Nontender, Nondistended; BS present  EXTREMITIES: Peripheral pulses intact. No edema B/L LE.  NEUROLOGIC:  No motor or sensory deficit.  PSYCH: Alert & oriented x 3    Consultant(s) Notes Reviewed:  [x ] YES  [ ] NO  Care Discussed with Consultants/Other Providers [ x] YES  [ ] NO    EKG reviewed  Telemetry reviewed    LABS:                        12.8   7.05  )-----------( 271      ( 27 Dec 2020 06:15 )             42.0         138  |  103  |  9<L>  ----------------------------<  128<H>  4.8   |  25  |  0.5<L>    Ca    10.0      27 Dec 2020 06:15  Mg     1.9         TPro  6.6  /  Alb  4.0  /  TBili  0.3  /  DBili  x   /  AST  12  /  ALT  10  /  AlkPhos  75        Serum Pro-Brain Natriuretic Peptide: 4388 pg/mL (20 @ 12:56)    Trop <0.01, CKMB --, CK --, 20 @ 12:56        RADIOLOGY & ADDITIONAL TESTS:    < from: TTE Echo Complete w/o Contrast w/ Doppler (20 @ 10:54) >    Summary:   1. Left ventricular ejection fraction, by visual estimation, is 45 to 50%.   2. Moderate to severe left atrial enlargement.   3. Moderately enlarged right atrium.   4. Small pericardial effusion.   5. Moderate mitral valve regurgitation.   6. Thickening of the anterior and posterior mitral valve leaflets.   7. Mild-moderate tricuspid regurgitation.   8. Estimated pulmonary artery systolic pressure is 46.6 mmHg assuming a right atrial pressure of 8 mmHg, which is consistent with mild pulmonary hypertension.    < end of copied text >    Imaging or report Personally Reviewed:  [ ] YES  [ ] NO    Medications:  Standing  apixaban 5 milliGRAM(s) Oral every 12 hours  chlorhexidine 4% Liquid 1 Application(s) Topical <User Schedule>  diltiazem    Tablet 60 milliGRAM(s) Oral every 6 hours  sodium chloride 0.9%. 1000 milliLiter(s) IV Continuous <Continuous>    PRN Meds      Case discussed with resident    Care discussed with pt/family           TOYA MESA  70y Female    CHIEF COMPLAINT:    Patient is a 70y old  Female who presents with a chief complaint of Afib (27 Dec 2020 22:40)      INTERVAL HPI/OVERNIGHT EVENTS:    Patient seen and examined. S/P ELIO. No palpitations. No cp. C/O SOB. Remains in rapid A-Fib    ROS: All other systems are negative.    Vital Signs:    T(F): 97.2 (20 @ 06:06), Max: 98.1 (20 @ 20:06)  HR: 123 (20 @ 06:06) (88 - 123)  BP: 125/73 (20 @ 06:06) (111/79 - 137/84)  RR: 19 (20 @ 06:06) (18 - 19)  SpO2: 95% (20 @ 19:52) (90% - 95%)  I&O's Summary    27 Dec 2020 07:01  -  28 Dec 2020 07:00  --------------------------------------------------------  IN: 2300 mL / OUT: 0 mL / NET: 2300 mL      Daily     Daily Weight in k.7 (28 Dec 2020 06:06)  CAPILLARY BLOOD GLUCOSE          PHYSICAL EXAM:    GENERAL:  NAD  SKIN: No rashes or lesions  HENT: Atraumatic Normocephalic. PERRL. Moist membranes.  NECK: Supple, No JVD. No lymphadenopathy.  PULMONARY: CTA B/L. No wheezing. No rales  CVS: Normal S1, S2. Rate and Rhythm are IRIR. No murmurs.  ABDOMEN/GI: Soft, Nontender, Nondistended; BS present  EXTREMITIES: Peripheral pulses intact. No edema B/L LE.  NEUROLOGIC:  No motor or sensory deficit.  PSYCH: Alert & oriented x 3    Consultant(s) Notes Reviewed:  [x ] YES  [ ] NO  Care Discussed with Consultants/Other Providers [ x] YES  [ ] NO    EKG reviewed  Telemetry reviewed    LABS:                        12.8   7.05  )-----------( 271      ( 27 Dec 2020 06:15 )             42.0         138  |  103  |  9<L>  ----------------------------<  128<H>  4.8   |  25  |  0.5<L>    Ca    10.0      27 Dec 2020 06:15  Mg     1.9         TPro  6.6  /  Alb  4.0  /  TBili  0.3  /  DBili  x   /  AST  12  /  ALT  10  /  AlkPhos  75        Serum Pro-Brain Natriuretic Peptide: 4388 pg/mL (20 @ 12:56)    Trop <0.01, CKMB --, CK --, 20 @ 12:56        RADIOLOGY & ADDITIONAL TESTS:    < from: TTE Echo Complete w/o Contrast w/ Doppler (20 @ 10:54) >    Summary:   1. Left ventricular ejection fraction, by visual estimation, is 45 to 50%.   2. Moderate to severe left atrial enlargement.   3. Moderately enlarged right atrium.   4. Small pericardial effusion.   5. Moderate mitral valve regurgitation.   6. Thickening of the anterior and posterior mitral valve leaflets.   7. Mild-moderate tricuspid regurgitation.   8. Estimated pulmonary artery systolic pressure is 46.6 mmHg assuming a right atrial pressure of 8 mmHg, which is consistent with mild pulmonary hypertension.    < end of copied text >    Imaging or report Personally Reviewed:  [ ] YES  [ ] NO    Medications:  Standing  apixaban 5 milliGRAM(s) Oral every 12 hours  chlorhexidine 4% Liquid 1 Application(s) Topical <User Schedule>  diltiazem    Tablet 60 milliGRAM(s) Oral every 6 hours  sodium chloride 0.9%. 1000 milliLiter(s) IV Continuous <Continuous>    PRN Meds      Case discussed with resident    Care discussed with pt/family

## 2020-12-28 NOTE — PROGRESS NOTE ADULT - SUBJECTIVE AND OBJECTIVE BOX
· Subjective and Objective:   INTERVAL HPI/OVERNIGHT EVENTS: ELIO showed possible SANAM thrombus. Cardioversion was not performed due to that reason. Better HR control.    MEDICATIONS  (STANDING):  aMIOdarone    Tablet 200 milliGRAM(s) Oral two times a day  aMIOdarone Infusion 1 mG/Min (33.3 mL/Hr) IV Continuous <Continuous>  aMIOdarone Infusion 0.5 mG/Min (16.7 mL/Hr) IV Continuous <Continuous>  apixaban 5 milliGRAM(s) Oral every 12 hours  chlorhexidine 4% Liquid 1 Application(s) Topical <User Schedule>  diltiazem    Tablet 60 milliGRAM(s) Oral every 6 hours  furosemide   Injectable 40 milliGRAM(s) IV Push every 12 hours  metoprolol tartrate 25 milliGRAM(s) Oral two times a day    MEDICATIONS  (PRN):      Allergies    No Known Allergies    Intolerances        REVIEW OF SYSTEMS: No CP, palpitations, dizziness or SOB    Vital Signs Last 24 Hrs  -130/min)          Physical Exam  GENERAL: In no apparent distress, well nourished, and hydrated.  HEAD:  Atraumatic, Normocephalic  EYES: EOMI, PERRLA, conjunctiva and sclera clear  ENMT: No tonsillar erythema, exudates, or enlargements; ist mucous membranes, Good dentition, No lesions  NECK: Supple and normal thyroid.  No JVD or carotid bruit.  Carotid pulse is 2+ bilaterally.  HEART: Irregular rate and rhythm; No murmurs, rubs, or gallops.  PULMONARY: Clear to auscultation and perfusion.  No rales, wheezing, or rhonchi bilaterally.  ABDOMEN: Soft, Nontender, Nondistended; Bowel sounds present  EXTREMITIES:  2+ Peripheral Pulses, No clubbing, cyanosis, or edema  NEUROLOGICAL: Grossly nonfocal    LABS:                        14.2   8.47  )-----------( 325      ( 29 Dec 2020 06:02 )             45.5     12-29    138  |  98  |  13  ----------------------------<  126<H>  4.5   |  29  |  0.7    Ca    10.0      29 Dec 2020 06:02  Mg     2.0     12-29    TPro  6.4  /  Alb  3.8  /  TBili  0.6  /  DBili  x   /  AST  13  /  ALT  12  /  AlkPhos  78  12-29    PT/INR - ( 28 Dec 2020 05:31 )   PT: 16.90 sec;   INR: 1.47 ratio         PTT - ( 28 Dec 2020 05:31 )  PTT:34.7 sec    TELE: -130s bpm    RADIOLOGY & ADDITIONAL TESTS:  < from: 12 Lead ECG (12.26.20 @ 12:37) >    Ventricular Rate 156 BPM    QRS Duration 76 ms    Q-T Interval 278 ms    QTC Calculation(Bazett) 448 ms    R Axis 119 degrees    T Axis 46 degrees    Diagnosis Line Atrial fibrillation with rapid ventricular response  Right axis deviation  Low voltage QRS  Abnormal ECG    Confirmed by Seng Canseco ([id number]) on 12/26/2020 2:12:05 PM    < end of copied text >

## 2020-12-28 NOTE — PROGRESS NOTE ADULT - ASSESSMENT
71y/o F with PMH of Afib with hx of ablation not on AC presents with palpitations and diarrhea. Pt admits to having daily diarrhea for >3wks and on questioning thinks she had some intermittent palpitations for past few days. 69y/o F with PMH of Afib with hx of ablation not on AC presents with palpitations and diarrhea. Pt admits to having daily diarrhea for >3wks and on questioning thinks she had some intermittent palpitations for past few days.      Persistent A-Fib wit RVR  Possibly LA appendage thrombus   H/O ablation for A-Fib  Diarrhea - resolved         PLAN:    ·	ECHO reviewed. EF is 45-50%  ·	S/P ELIO. Possibly LA appendage clot.   ·	D/W the cardiology. May need CTA chest to R/O thrombus  ·	EP eval noted. Recommended to start Amiodarone 200 mg po q 12h  ·	Still tachycardia. Will add Metoprolol 25 mg po q 12h  ·	Cont Eliquis and PO Cardizem 60 mg po q 6h 71y/o F with PMH of Afib with hx of ablation not on AC presents with palpitations and diarrhea. Pt admits to having daily diarrhea for >3wks and on questioning thinks she had some intermittent palpitations for past few days.      Persistent A-Fib wit RVR  Possibly LA appendage thrombus   H/O ablation for A-Fib  Diarrhea - resolved         PLAN:    ·	ECHO reviewed. EF is 45-50%  ·	S/P ELIO. Possibly LA appendage clot.   ·	D/W the cardiology. May need CTA chest to R/O thrombus  ·	EP eval noted. Recommended to start Amiodarone 200 mg po q 12h  ·	Still tachycardiac. Will add Metoprolol 25 mg po q 12h  ·	Cont Eliquis and PO Cardizem 60 mg po q 6h 69y/o F with PMH of Afib with hx of ablation not on AC presents with palpitations and diarrhea. Pt admits to having daily diarrhea for >3wks and on questioning thinks she had some intermittent palpitations for past few days.      Persistent A-Fib wit RVR  Acute HFpEF  Possibly LA appendage thrombus   H/O ablation for A-Fib  Diarrhea - resolved         PLAN:    ·	ECHO reviewed. EF is 45-50%  ·	S/P ELIO. Possibly LA appendage clot.   ·	D/W the cardiology. May need CTA chest to R/O thrombus  ·	EP eval noted. Recommended to start Amiodarone 200 mg po q 12h  ·	Still tachycardiac. Will add Metoprolol 25 mg po q 12h  ·	Cont Eliquis and PO Cardizem 60 mg po q 6h  ·	Lasix 40 mg ivp q 12h  ·	Check i's and o's and daily wt  ·	Low salt diet and water restriction to 1.5 L/D

## 2020-12-28 NOTE — PROGRESS NOTE ADULT - ASSESSMENT
71 yo F with PMHx of AFib, hx of cardioversion 3 yrs ago, not on any medications presented to ED with palpitation, hx of diarrhea for several weeks, found to be in rapid AFib with RVR, scheduled for DCCV with EP today; also discovered to have bilateral pleural effusions on admission, requiring O2 NC 2L.    #Acute hypoxemic respiratory failure due to acute HF (EF 45-50%) exacerbation  #Acute onset of rapid AFib with RVR likely contributed to by acute HF exacerbation  - CXR on admission revealed small bilateral pleural effusions, BNP 4388, Troponin wnl  - Pt requiring O2 NC 2L, IVF d/c'd, will initiated IV Lasix 40 mg q12  - CHADSVASC 1  - Initiated on Cardizem 60 mg q6, Eliquis 5mg q12  - TSH, Lyme titers pending  - EP following - scheduled for DCCV today    #Diarrhea prior to admission - now resolved  - Denies any abdominal pain or discomfort; has not had BM for 2-3 days but does not wish to take any laxatives at this time    DVT ppx: Eliquis  Diet: Regular, NPO for procedure  Activity: AAT  Full code  Dispo: pending DCCV, improvement of O2 requirements

## 2020-12-28 NOTE — PROGRESS NOTE ADULT - SUBJECTIVE AND OBJECTIVE BOX
Patient Information:  TOYA MESA / 70y / Female / MRN#:[id number]    Hospital Day: 2d    Interval History:  Patient seen and examined at bedside. No acute events overnight.  Pt is resting in bed, on RA, appears comfortable. Denies diarrhea, abdominal discomfort. Denies palpitations, chest discomfort.  Pt later placed O2 NC 2L as was saturating 90% on RA, IVF stopped.    Past Medical History:  Afib      Past Surgical History:  H/O prior ablation treatment      Allergies:  No Known Allergies    Medications:  PRN:    Standing:  apixaban 5 milliGRAM(s) Oral every 12 hours  chlorhexidine 4% Liquid 1 Application(s) Topical <User Schedule>  diltiazem    Tablet 60 milliGRAM(s) Oral every 6 hours  furosemide   Injectable 40 milliGRAM(s) IV Push every 12 hours    Home:    Vitals:  T(C): 36.2, Max: 36.7 (12-27-20 @ 20:06)  T(F): 97.2, Max: 98.1 (12-27-20 @ 20:06)  HR: 123 (88 - 123)  BP: 125/73 (111/79 - 137/84)  RR: 19 (18 - 19)  SpO2: 95% (90% - 95%)    Physical Exam:  General: Awake, alert, NAD, resting comfortably in bed, on O2 NC 2L  HEENT: Head NC/AT  Heart: irregularly irregular  Lungs: Mild rales in bilateral bases  Abdomen: Soft, nontender, nondistended, BS+  Extremities: No edema, clubbing, cyanosis in upper or lower extremities  Neuro: AAOx3, NFD    Labs:  CBC (12-28 @ 05:31)                        Hgb: 13.5   WBC: 7.64  )-----------------( Plts: 286                              Hct: 42.5     Chem (12-28 @ 05:31)  Na: 138  |     Cl: 101     |  BUN: 9   -----------------------------------------< Gluc: 107    K: 4.6   |    HCO3: 27  |  Cr: 0.5    Ca 9.5 (12-28 @ 05:31)  Mg 1.9 (12-28 @ 05:31)    LFTs (12-28 @ 05:31)  TPro 6.4  /  Alb 3.6  TBili 0.8  /  DBili     AST 10  /  ALT 8   /  AlkPhos 73    Cardiac Markers (12-26 @ 12:56)  Troponin I X  Troponin T <0.01  CK X  CKMB X  CKMB Units X  Myoglobin X  Lactate X  ESR X        PT/INR (12-28 @ 05:31)  PT: 16.90; INR: 1.47   PTT: 34.7

## 2020-12-28 NOTE — PROCEDURE NOTE - I WAS PRESENT DURING THE KEY PORTIONS OF THE PROCEDURE AND IMMEDIATELY AVAILABLE DURING THE ENTIRE PROCEDURE
Statement Selected Detail Level: Simple Additional Notes: Spot has decreased in size since last visit.

## 2020-12-29 LAB
ALBUMIN SERPL ELPH-MCNC: 3.8 G/DL — SIGNIFICANT CHANGE UP (ref 3.5–5.2)
ALP SERPL-CCNC: 78 U/L — SIGNIFICANT CHANGE UP (ref 30–115)
ALT FLD-CCNC: 12 U/L — SIGNIFICANT CHANGE UP (ref 0–41)
ANION GAP SERPL CALC-SCNC: 11 MMOL/L — SIGNIFICANT CHANGE UP (ref 7–14)
AST SERPL-CCNC: 13 U/L — SIGNIFICANT CHANGE UP (ref 0–41)
BASOPHILS # BLD AUTO: 0.09 K/UL — SIGNIFICANT CHANGE UP (ref 0–0.2)
BASOPHILS NFR BLD AUTO: 1.1 % — HIGH (ref 0–1)
BILIRUB SERPL-MCNC: 0.6 MG/DL — SIGNIFICANT CHANGE UP (ref 0.2–1.2)
BUN SERPL-MCNC: 13 MG/DL — SIGNIFICANT CHANGE UP (ref 10–20)
CALCIUM SERPL-MCNC: 10 MG/DL — SIGNIFICANT CHANGE UP (ref 8.5–10.1)
CHLORIDE SERPL-SCNC: 98 MMOL/L — SIGNIFICANT CHANGE UP (ref 98–110)
CO2 SERPL-SCNC: 29 MMOL/L — SIGNIFICANT CHANGE UP (ref 17–32)
CREAT SERPL-MCNC: 0.7 MG/DL — SIGNIFICANT CHANGE UP (ref 0.7–1.5)
EOSINOPHIL # BLD AUTO: 0.21 K/UL — SIGNIFICANT CHANGE UP (ref 0–0.7)
EOSINOPHIL NFR BLD AUTO: 2.5 % — SIGNIFICANT CHANGE UP (ref 0–8)
GLUCOSE SERPL-MCNC: 126 MG/DL — HIGH (ref 70–99)
HCT VFR BLD CALC: 45.5 % — SIGNIFICANT CHANGE UP (ref 37–47)
HGB BLD-MCNC: 14.2 G/DL — SIGNIFICANT CHANGE UP (ref 12–16)
IMM GRANULOCYTES NFR BLD AUTO: 0.4 % — HIGH (ref 0.1–0.3)
LYMPHOCYTES # BLD AUTO: 1.46 K/UL — SIGNIFICANT CHANGE UP (ref 1.2–3.4)
LYMPHOCYTES # BLD AUTO: 17.2 % — LOW (ref 20.5–51.1)
MAGNESIUM SERPL-MCNC: 2 MG/DL — SIGNIFICANT CHANGE UP (ref 1.8–2.4)
MCHC RBC-ENTMCNC: 27.7 PG — SIGNIFICANT CHANGE UP (ref 27–31)
MCHC RBC-ENTMCNC: 31.2 G/DL — LOW (ref 32–37)
MCV RBC AUTO: 88.7 FL — SIGNIFICANT CHANGE UP (ref 81–99)
MONOCYTES # BLD AUTO: 0.69 K/UL — HIGH (ref 0.1–0.6)
MONOCYTES NFR BLD AUTO: 8.1 % — SIGNIFICANT CHANGE UP (ref 1.7–9.3)
NEUTROPHILS # BLD AUTO: 5.99 K/UL — SIGNIFICANT CHANGE UP (ref 1.4–6.5)
NEUTROPHILS NFR BLD AUTO: 70.7 % — SIGNIFICANT CHANGE UP (ref 42.2–75.2)
NRBC # BLD: 0 /100 WBCS — SIGNIFICANT CHANGE UP (ref 0–0)
PLATELET # BLD AUTO: 325 K/UL — SIGNIFICANT CHANGE UP (ref 130–400)
POTASSIUM SERPL-MCNC: 4.5 MMOL/L — SIGNIFICANT CHANGE UP (ref 3.5–5)
POTASSIUM SERPL-SCNC: 4.5 MMOL/L — SIGNIFICANT CHANGE UP (ref 3.5–5)
PROT SERPL-MCNC: 6.4 G/DL — SIGNIFICANT CHANGE UP (ref 6–8)
RBC # BLD: 5.13 M/UL — SIGNIFICANT CHANGE UP (ref 4.2–5.4)
RBC # FLD: 13.2 % — SIGNIFICANT CHANGE UP (ref 11.5–14.5)
SODIUM SERPL-SCNC: 138 MMOL/L — SIGNIFICANT CHANGE UP (ref 135–146)
WBC # BLD: 8.47 K/UL — SIGNIFICANT CHANGE UP (ref 4.8–10.8)
WBC # FLD AUTO: 8.47 K/UL — SIGNIFICANT CHANGE UP (ref 4.8–10.8)

## 2020-12-29 PROCEDURE — 99233 SBSQ HOSP IP/OBS HIGH 50: CPT

## 2020-12-29 PROCEDURE — 75572 CT HRT W/3D IMAGE: CPT | Mod: 26

## 2020-12-29 RX ORDER — APIXABAN 2.5 MG/1
10 TABLET, FILM COATED ORAL EVERY 12 HOURS
Refills: 0 | Status: DISCONTINUED | OUTPATIENT
Start: 2020-12-29 | End: 2020-12-31

## 2020-12-29 RX ORDER — AMIODARONE HYDROCHLORIDE 400 MG/1
200 TABLET ORAL
Refills: 0 | Status: DISCONTINUED | OUTPATIENT
Start: 2020-12-29 | End: 2020-12-30

## 2020-12-29 RX ADMIN — Medication 25 MILLIGRAM(S): at 17:28

## 2020-12-29 RX ADMIN — Medication 60 MILLIGRAM(S): at 23:49

## 2020-12-29 RX ADMIN — AMIODARONE HYDROCHLORIDE 200 MILLIGRAM(S): 400 TABLET ORAL at 17:28

## 2020-12-29 RX ADMIN — APIXABAN 5 MILLIGRAM(S): 2.5 TABLET, FILM COATED ORAL at 12:28

## 2020-12-29 RX ADMIN — Medication 25 MILLIGRAM(S): at 05:46

## 2020-12-29 RX ADMIN — Medication 60 MILLIGRAM(S): at 05:46

## 2020-12-29 RX ADMIN — APIXABAN 10 MILLIGRAM(S): 2.5 TABLET, FILM COATED ORAL at 17:28

## 2020-12-29 RX ADMIN — Medication 60 MILLIGRAM(S): at 17:28

## 2020-12-29 RX ADMIN — Medication 40 MILLIGRAM(S): at 05:46

## 2020-12-29 RX ADMIN — AMIODARONE HYDROCHLORIDE 16.7 MG/MIN: 400 TABLET ORAL at 01:18

## 2020-12-29 RX ADMIN — Medication 60 MILLIGRAM(S): at 12:28

## 2020-12-29 RX ADMIN — Medication 40 MILLIGRAM(S): at 17:28

## 2020-12-29 NOTE — PROGRESS NOTE ADULT - ASSESSMENT
69 yo F with PMHx of AFib, hx of cardioversion 3 yrs ago, not on any medications presented to ED with palpitation, hx of diarrhea for several weeks, found to be in rapid AFib with RVR, scheduled for DCCV with EP today; also discovered to have bilateral pleural effusions on admission, requiring O2 NC 2L.    #Acute hypoxemic respiratory failure due to acute HF (EF 45-50%) exacerbation  #Acute onset of rapid AFib with RVR likely contributed to by acute HF exacerbation  - CXR on admission revealed small bilateral pleural effusions, BNP 4388, Troponin wnl  - Pt was requiring O2 NC 2L, CXR reveals bilateral pleural effusions  - C/w IV Lasix 40 mg BID   - CHADSVASC 1  - Initiated on Cardizem 60 mg q6, Eliquis 5mg q12  - TSH wnl, Lyme titers negative  - EP following - pt had ELIO yesterday, had potential small fixed thrombus in SANAM; CT non coronary pending; if no thrombus identified, will proceed with cardioversion  - Initiated on Amiodarone drip, to be transitioned to PO Amiodarone at 6 pm - 200 mg BID for 1 week, 200 qd thereafter     #Diarrhea prior to admission - now resolved  - Denies any abdominal pain or discomfort; has not had BM for 2-3 days but does not wish to take any laxatives at this time    DVT ppx: Eliquis  Diet: Regular  Activity: AAT  Full code  Dispo: pending DCCV, IV diuresis   69 yo F with PMHx of AFib, hx of cardioversion 3 yrs ago, not on any medications presented to ED with palpitation, hx of diarrhea for several weeks, found to be in rapid AFib with RVR, scheduled for DCCV with EP today; also discovered to have bilateral pleural effusions on admission, requiring O2 NC 2L.    #Acute hypoxemic respiratory failure due to acute HF (EF 45-50%) exacerbation  #Acute onset of rapid AFib with RVR likely contributed to by acute HF exacerbation  - CXR on admission revealed small bilateral pleural effusions, BNP 4388, Troponin wnl  - Pt was requiring O2 NC 2L, CXR reveals bilateral pleural effusions  - C/w IV Lasix 40 mg BID   - CHADSVASC 1  - TSH wnl, Lyme titers negative  - EP following - pt had ELIO yesterday, had potential small fixed thrombus in SANAM; CT non coronary reveals no thrombus   - Small L sided subsegmental PE identified on CT - will c/w AC and inc Eliquis to 10 mg BID for 7 days  - C/w Cardizem 60 mg q6 Initiated on Amiodarone drip, to be transitioned to PO Amiodarone at 6 pm - 200 mg BID for 1 week, 200 qd thereafter     #Diarrhea prior to admission - now resolved  - Denies any abdominal pain or discomfort; has not had BM for 2-3 days but does not wish to take any laxatives at this time    DVT ppx: Eliquis  Diet: Regular  Activity: AAT  Full code  Dispo: pending DCCV, IV diuresis   69 yo F with PMHx of AFib, hx of cardioversion 3 yrs ago, not on any medications presented to ED with palpitation, hx of diarrhea for several weeks, found to be in rapid AFib with RVR, scheduled for DCCV with EP today; also discovered to have bilateral pleural effusions on admission, requiring O2 NC 2L.    #Acute hypoxemic respiratory failure due to acute HF (EF 45-50%) exacerbation  #Acute onset of rapid AFib with RVR likely contributed to by acute HF exacerbation  - CXR on admission revealed small bilateral pleural effusions, BNP 4388, Troponin wnl  - Pt was requiring O2 NC 2L, CXR reveals bilateral pleural effusions  - C/w IV Lasix 40 mg BID   - CHADSVASC 1  - TSH wnl, Lyme titers negative  - EP following - pt had ELIO yesterday, had potential small fixed thrombus in SANAM; CT non coronary reveals no thrombus   - Small L sided subsegmental PE, small R sided subsegmental PE with consolidation possibly reflecting developing infarction identified on CT - will c/w AC and inc Eliquis to 10 mg BID for 7 days  - C/w Cardizem 60 mg q6 Initiated on Amiodarone drip, to be transitioned to PO Amiodarone at 6 pm - 200 mg BID for 1 week, 200 qd thereafter     #Diarrhea prior to admission - now resolved  - Denies any abdominal pain or discomfort; has not had BM for 2-3 days but does not wish to take any laxatives at this time    DVT ppx: Eliquis  Diet: Regular, NPO after MN for potential DCCV tomorrow  Activity: AAT  Full code  Dispo: pending DCCV, IV diuresis

## 2020-12-29 NOTE — PROGRESS NOTE ADULT - SUBJECTIVE AND OBJECTIVE BOX
Patient Information:  TOYA MESA / 70y / Female / MRN#:[id number]    Hospital Day: 3d    Interval History:  Patient seen and examined at bedside. No acute events overnight. Pt resting in bed, on RA, saturating 91%, states that she feels significantly better this morning. Denies dyspnea, chest discomfort, palpitations.     Past Medical History:  Afib      Past Surgical History:  H/O prior ablation treatment      Allergies:  No Known Allergies    Medications:  PRN:    Standing:  aMIOdarone    Tablet 200 milliGRAM(s) Oral two times a day  aMIOdarone Infusion 1 mG/Min (33.3 mL/Hr) IV Continuous <Continuous>  aMIOdarone Infusion 0.5 mG/Min (16.7 mL/Hr) IV Continuous <Continuous>  apixaban 5 milliGRAM(s) Oral every 12 hours  chlorhexidine 4% Liquid 1 Application(s) Topical <User Schedule>  diltiazem    Tablet 60 milliGRAM(s) Oral every 6 hours  furosemide   Injectable 40 milliGRAM(s) IV Push every 12 hours  metoprolol tartrate 25 milliGRAM(s) Oral two times a day    Home:    Vitals:  T(C): 36.3, Max: 36.3 (12-28-20 @ 12:28)  T(F): 97.3, Max: 97.3 (12-28-20 @ 12:28)  HR: 99 (72 - 140)  BP: 126/77 (85/64 - 153/89)  RR: 20 (17 - 20)  SpO2: 91% (91% - 91%)    Physical Exam:  General: Awake, alert, NAD, resting comfortably in bed, on RA  HEENT: Head NC/AT  Heart: irregularly irregular  Lungs: Dec breath sounds in bilateral bases  Abdomen: Soft, nontender, nondistended, BS+  Extremities: No edema, clubbing, cyanosis in upper or lower extremities  Neuro: AAOx3, NFD    Labs:  CBC (12-29 @ 06:02)                        Hgb: 14.2   WBC: 8.47  )-----------------( Plts: 325                              Hct: 45.5     Chem (12-29 @ 06:02)  Na: 138  |     Cl: 98     |  BUN: 13  -----------------------------------------< Gluc: 126    K: 4.5   |    HCO3: 29  |  Cr: 0.7    Ca 10.0 (12-29 @ 06:02)  Mg 2.0 (12-29 @ 06:02)    LFTs (12-29 @ 06:02)  TPro 6.4  /  Alb 3.8  TBili 0.6  /  DBili     AST 13  /  ALT 12  /  AlkPhos 78    Cardiac Markers (12-26 @ 12:56)  Troponin I X  Troponin T <0.01  CK X  CKMB X  CKMB Units X  Myoglobin X  Lactate X  ESR X        PT/INR (12-28 @ 05:31)  PT: 16.90; INR: 1.47   PTT: 34.7             Microbiology:  Culture - Blood (collected 12-27 @ 06:15)  Source: .Blood None  Preliminary Report (12-28 @ 17:02):    No growth to date.        Radiology:    < from: Xray Chest 1 View AP/PA (12.28.20 @ 10:53) >  mpression:    No significant change in bibasilar opacities and pleural effusions.    < end of copied text >

## 2020-12-29 NOTE — PROGRESS NOTE ADULT - ASSESSMENT
Assessment: 70, F, Persistent AF s/p ILR, ?CAD, no recent follow-up with physicians due to social constraints, now admitted for acute diarrhea and AF/RVR.  Diarrhea has resolved, but patient remains in AF now rate controlled. + Recent increase in fatigue.    Impression:  Persistent AF, rate controlled  Systolic Dysfunction    Plan:  - ELIO showed ?clot  - CCTA pending  - If no clot, will proceed with DCCV  - Cont Eliquis 5mg Q12h  - Cont Cardizem CD 120mg  - Cont Amio 200mg Q12h x 1 week followed by 200mg QD Assessment: 70, F, Persistent AF s/p ILR, ?CAD, no recent follow-up with physicians due to social constraints, now admitted for acute diarrhea and AF/RVR.  Diarrhea has resolved, but patient remains in AF now rate controlled. + Recent increase in fatigue.    Impression:  Persistent AF, rate controlled  Systolic Dysfunction    Plan:  - ELIO showed ?clot  - CCTA pending  - If no clot, will proceed with DCCV  - Cont Eliquis 5mg Q12h  - Cont Cardizem CD 120mg  - Cont Amio 200mg Q12h x 1 week followed by 200mg QD  - Cont tele monitoring  - Follow up with Dr Watt as outpatient in 1 month, will need repeat ELIO as outpatient Assessment: 70, F, Persistent AF s/p ILR, ?CAD, no recent follow-up with physicians due to social constraints, now admitted for acute diarrhea and AF/RVR.  Diarrhea has resolved, but patient remains in AF now rate controlled. + Recent increase in fatigue.    Impression:  Persistent AF, rate controlled  Systolic Dysfunction    Plan:  - ELIO showed ?clot  - CCTA shows no clot but small subsegmental BL PE's with possible pulmonary infarct  - Plan for DCCV tomorrow  - NPO after midnight  - Cont Eliquis 5mg Q12h  - Cont Cardizem CD 120mg  - Cont Amio 200mg Q12h x 1 week followed by 200mg QD  - Cont tele monitoring  - Follow up with Dr Watt as outpatient in 1 month, will need repeat ELIO as outpatient

## 2020-12-29 NOTE — PROGRESS NOTE ADULT - SUBJECTIVE AND OBJECTIVE BOX
INTERVAL HPI/OVERNIGHT EVENTS: No acute events, patient in AF rate controlled on Amio and feeling much better    MEDICATIONS  (STANDING):  aMIOdarone    Tablet 200 milliGRAM(s) Oral two times a day  aMIOdarone Infusion 1 mG/Min (33.3 mL/Hr) IV Continuous <Continuous>  aMIOdarone Infusion 0.5 mG/Min (16.7 mL/Hr) IV Continuous <Continuous>  apixaban 5 milliGRAM(s) Oral every 12 hours  chlorhexidine 4% Liquid 1 Application(s) Topical <User Schedule>  diltiazem    Tablet 60 milliGRAM(s) Oral every 6 hours  furosemide   Injectable 40 milliGRAM(s) IV Push every 12 hours  metoprolol tartrate 25 milliGRAM(s) Oral two times a day    MEDICATIONS  (PRN):      Allergies    No Known Allergies    Intolerances        REVIEW OF SYSTEMS: No CP, palpitations, dizziness or SOB    Vital Signs Last 24 Hrs  T(C): 36.3 (29 Dec 2020 07:58), Max: 36.3 (28 Dec 2020 12:28)  T(F): 97.3 (29 Dec 2020 07:58), Max: 97.3 (28 Dec 2020 12:28)  HR: 90 (29 Dec 2020 07:58) (72 - 140)  BP: 85/64 (29 Dec 2020 07:58) (85/64 - 153/89)  BP(mean): --  RR: 20 (29 Dec 2020 07:58) (17 - 20)  SpO2: 91% (28 Dec 2020 17:22) (91% - 91%)    12-28-20 @ 07:01  -  12-29-20 @ 07:00  --------------------------------------------------------  IN: 133.2 mL / OUT: 500 mL / NET: -366.8 mL        Physical Exam  GENERAL: In no apparent distress, well nourished, and hydrated.  HEAD:  Atraumatic, Normocephalic  EYES: EOMI, PERRLA, conjunctiva and sclera clear  ENMT: No tonsillar erythema, exudates, or enlargements; ist mucous membranes, Good dentition, No lesions  NECK: Supple and normal thyroid.  No JVD or carotid bruit.  Carotid pulse is 2+ bilaterally.  HEART: Irregular rate and rhythm; No murmurs, rubs, or gallops.  PULMONARY: Clear to auscultation and perfusion.  No rales, wheezing, or rhonchi bilaterally.  ABDOMEN: Soft, Nontender, Nondistended; Bowel sounds present  EXTREMITIES:  2+ Peripheral Pulses, No clubbing, cyanosis, or edema  NEUROLOGICAL: Grossly nonfocal    LABS:                        14.2   8.47  )-----------( 325      ( 29 Dec 2020 06:02 )             45.5     12-29    138  |  98  |  13  ----------------------------<  126<H>  4.5   |  29  |  0.7    Ca    10.0      29 Dec 2020 06:02  Mg     2.0     12-29    TPro  6.4  /  Alb  3.8  /  TBili  0.6  /  DBili  x   /  AST  13  /  ALT  12  /  AlkPhos  78  12-29    PT/INR - ( 28 Dec 2020 05:31 )   PT: 16.90 sec;   INR: 1.47 ratio         PTT - ( 28 Dec 2020 05:31 )  PTT:34.7 sec    TELE: AF 80s-90s bpm    RADIOLOGY & ADDITIONAL TESTS:  < from: 12 Lead ECG (12.26.20 @ 12:37) >    Ventricular Rate 156 BPM    QRS Duration 76 ms    Q-T Interval 278 ms    QTC Calculation(Bazett) 448 ms    R Axis 119 degrees    T Axis 46 degrees    Diagnosis Line Atrial fibrillation with rapid ventricular response  Right axis deviation  Low voltage QRS  Abnormal ECG    Confirmed by Seng Canseco ([id number]) on 12/26/2020 2:12:05 PM    < end of copied text >

## 2020-12-29 NOTE — PROGRESS NOTE ADULT - ASSESSMENT
71y/o F with PMH of Afib with hx of ablation not on AC presents with palpitations and diarrhea. Pt admits to having daily diarrhea for >3wks and on questioning thinks she had some intermittent palpitations for past few days.      Persistent A-Fib wit RVR  Acute HFpEF  Possibly LA appendage thrombus   H/O ablation for A-Fib  Diarrhea - resolved         PLAN:    ·	Care D/W the EP. On Amiodarone infusion done. Once the infusion is complete start her on Amiodarone  mg q 12h for one week, then 200 mg po daily  ·	CCTA to R/O L atrial thrombus.   ·	ECHO reviewed. EF is 45-50%  ·	S/P ELIO. Possibly LA appendage clot.   ·	Cont Metoprolol 25 mg po q 12h and Cardizem 60 mg po q 6h  ·	Cont Eliquis   ·	Cont Lasix 40 mg ivp q 12h  ·	Check i's and o's and daily wt  ·	Low salt diet and water restriction to 1.5 L/D

## 2020-12-29 NOTE — PROGRESS NOTE ADULT - SUBJECTIVE AND OBJECTIVE BOX
TOYA MESA  70y Female    CHIEF COMPLAINT:    Patient is a 70y old  Female who presents with a chief complaint of Afib (29 Dec 2020 10:26)      INTERVAL HPI/OVERNIGHT EVENTS:    Patient seen and examined. Denies palpitations. Still on O2 due to drop in O2 sat on RA.    ROS: All other systems are negative.    Vital Signs:    T(F): 97.3 (20 @ 07:58), Max: 97.3 (20 @ 12:28)  HR: 77 (20 @ 10:31) (72 - 140)  BP: 119/70 (20 @ 10:31) (85/64 - 148/89)  RR: 20 (20 @ 10:31) (17 - 20)  SpO2: 91% (20 @ 09:20) (91% - 91%)  I&O's Summary    28 Dec 2020 07:01  -  29 Dec 2020 07:00  --------------------------------------------------------  IN: 133.2 mL / OUT: 500 mL / NET: -366.8 mL    29 Dec 2020 07:01  -  29 Dec 2020 12:04  --------------------------------------------------------  IN: 83.4 mL / OUT: 0 mL / NET: 83.4 mL      Daily     Daily Weight in k.6 (29 Dec 2020 04:00)  CAPILLARY BLOOD GLUCOSE          PHYSICAL EXAM:    GENERAL:  NAD  SKIN: No rashes or lesions  HENT: Atraumatic Normocephalic. PERRL. Moist membranes.  NECK: Supple, No JVD. No lymphadenopathy.  PULMONARY: CTA B/L. No wheezing. No rales  CVS: Normal S1, S2. Rate and Rhythm are IRIR. No murmurs.  ABDOMEN/GI: Soft, Nontender, Nondistended; BS present  EXTREMITIES: Peripheral pulses intact. No edema B/L LE.  NEUROLOGIC:  No motor or sensory deficit.  PSYCH: Alert & oriented x 3    Consultant(s) Notes Reviewed:  [x ] YES  [ ] NO  Care Discussed with Consultants/Other Providers [ x] YES  [ ] NO    EKG reviewed  Telemetry reviewed    LABS:                        14.2   8.47  )-----------( 325      ( 29 Dec 2020 06:02 )             45.5         138  |  98  |  13  ----------------------------<  126<H>  4.5   |  29  |  0.7    Ca    10.0      29 Dec 2020 06:02  Mg     2.0         TPro  6.4  /  Alb  3.8  /  TBili  0.6  /  DBili  x   /  AST  13  /  ALT  12  /  AlkPhos  78      PT/INR - ( 28 Dec 2020 05:31 )   PT: 16.90 sec;   INR: 1.47 ratio         PTT - ( 28 Dec 2020 05:31 )  PTT:34.7 sec  Serum Pro-Brain Natriuretic Peptide: 4388 pg/mL (20 @ 12:56)    Trop <0.01, CKMB --, CK --, 20 @ 12:56      Culture - Blood (collected 27 Dec 2020 06:15)  Source: .Blood None  Preliminary Report (28 Dec 2020 17:02):    No growth to date.        RADIOLOGY & ADDITIONAL TESTS:      Imaging or report Personally Reviewed:  [ ] YES  [ ] NO    Medications:  Standing  aMIOdarone    Tablet 200 milliGRAM(s) Oral two times a day  aMIOdarone Infusion 1 mG/Min IV Continuous <Continuous>  aMIOdarone Infusion 0.5 mG/Min IV Continuous <Continuous>  apixaban 5 milliGRAM(s) Oral every 12 hours  chlorhexidine 4% Liquid 1 Application(s) Topical <User Schedule>  diltiazem    Tablet 60 milliGRAM(s) Oral every 6 hours  furosemide   Injectable 40 milliGRAM(s) IV Push every 12 hours  metoprolol tartrate 25 milliGRAM(s) Oral two times a day    PRN Meds      Case discussed with resident    Care discussed with pt/family

## 2020-12-29 NOTE — PROGRESS NOTE ADULT - ATTENDING COMMENTS
## Persistent AF  ## s/p ILR  ## Systolic Dysfunction  ## Possible SANAM Thrombus    - CTA today  - Continue with Eliquis  - Rate control with IV amiodarone, cardizem and metoprolol  - If no SANAM thrombus, we will consider DCCV.      I was physically present for the key portions of the evaluation and management (E/M) service provided.  I agree with the above history, physical, and plan which I have reviewed and edited where appropriate.
## Persistent AF  ## s/p ILR  ## Systolic Dysfunction  ## Possible SANAM Thrombus    - CTA today  - Continue with Eliquis  - Better rate controlled with amiodarone, cardizem and metoprolol  - If no SANAM thrombus, we will consider DCCV.

## 2020-12-30 ENCOUNTER — TRANSCRIPTION ENCOUNTER (OUTPATIENT)
Age: 70
End: 2020-12-30

## 2020-12-30 LAB
ALBUMIN SERPL ELPH-MCNC: 4.1 G/DL — SIGNIFICANT CHANGE UP (ref 3.5–5.2)
ALP SERPL-CCNC: 86 U/L — SIGNIFICANT CHANGE UP (ref 30–115)
ALT FLD-CCNC: 16 U/L — SIGNIFICANT CHANGE UP (ref 0–41)
ANION GAP SERPL CALC-SCNC: 14 MMOL/L — SIGNIFICANT CHANGE UP (ref 7–14)
AST SERPL-CCNC: 16 U/L — SIGNIFICANT CHANGE UP (ref 0–41)
BASOPHILS # BLD AUTO: 0.08 K/UL — SIGNIFICANT CHANGE UP (ref 0–0.2)
BASOPHILS NFR BLD AUTO: 0.9 % — SIGNIFICANT CHANGE UP (ref 0–1)
BILIRUB SERPL-MCNC: 0.6 MG/DL — SIGNIFICANT CHANGE UP (ref 0.2–1.2)
BUN SERPL-MCNC: 17 MG/DL — SIGNIFICANT CHANGE UP (ref 10–20)
CALCIUM SERPL-MCNC: 10.2 MG/DL — HIGH (ref 8.5–10.1)
CHLORIDE SERPL-SCNC: 98 MMOL/L — SIGNIFICANT CHANGE UP (ref 98–110)
CO2 SERPL-SCNC: 28 MMOL/L — SIGNIFICANT CHANGE UP (ref 17–32)
CREAT SERPL-MCNC: 0.8 MG/DL — SIGNIFICANT CHANGE UP (ref 0.7–1.5)
EOSINOPHIL # BLD AUTO: 0.23 K/UL — SIGNIFICANT CHANGE UP (ref 0–0.7)
EOSINOPHIL NFR BLD AUTO: 2.6 % — SIGNIFICANT CHANGE UP (ref 0–8)
GLUCOSE SERPL-MCNC: 131 MG/DL — HIGH (ref 70–99)
HCT VFR BLD CALC: 49.6 % — HIGH (ref 37–47)
HGB BLD-MCNC: 16 G/DL — SIGNIFICANT CHANGE UP (ref 12–16)
IMM GRANULOCYTES NFR BLD AUTO: 0.3 % — SIGNIFICANT CHANGE UP (ref 0.1–0.3)
LYMPHOCYTES # BLD AUTO: 1.33 K/UL — SIGNIFICANT CHANGE UP (ref 1.2–3.4)
LYMPHOCYTES # BLD AUTO: 14.9 % — LOW (ref 20.5–51.1)
MAGNESIUM SERPL-MCNC: 2 MG/DL — SIGNIFICANT CHANGE UP (ref 1.8–2.4)
MCHC RBC-ENTMCNC: 28.2 PG — SIGNIFICANT CHANGE UP (ref 27–31)
MCHC RBC-ENTMCNC: 32.3 G/DL — SIGNIFICANT CHANGE UP (ref 32–37)
MCV RBC AUTO: 87.3 FL — SIGNIFICANT CHANGE UP (ref 81–99)
MONOCYTES # BLD AUTO: 0.76 K/UL — HIGH (ref 0.1–0.6)
MONOCYTES NFR BLD AUTO: 8.5 % — SIGNIFICANT CHANGE UP (ref 1.7–9.3)
NEUTROPHILS # BLD AUTO: 6.52 K/UL — HIGH (ref 1.4–6.5)
NEUTROPHILS NFR BLD AUTO: 72.8 % — SIGNIFICANT CHANGE UP (ref 42.2–75.2)
NRBC # BLD: 0 /100 WBCS — SIGNIFICANT CHANGE UP (ref 0–0)
PLATELET # BLD AUTO: 352 K/UL — SIGNIFICANT CHANGE UP (ref 130–400)
POTASSIUM SERPL-MCNC: 4.2 MMOL/L — SIGNIFICANT CHANGE UP (ref 3.5–5)
POTASSIUM SERPL-SCNC: 4.2 MMOL/L — SIGNIFICANT CHANGE UP (ref 3.5–5)
PROT SERPL-MCNC: 6.8 G/DL — SIGNIFICANT CHANGE UP (ref 6–8)
RBC # BLD: 5.68 M/UL — HIGH (ref 4.2–5.4)
RBC # FLD: 13.2 % — SIGNIFICANT CHANGE UP (ref 11.5–14.5)
SODIUM SERPL-SCNC: 140 MMOL/L — SIGNIFICANT CHANGE UP (ref 135–146)
TROPONIN T SERPL-MCNC: <0.01 NG/ML — SIGNIFICANT CHANGE UP
WBC # BLD: 8.95 K/UL — SIGNIFICANT CHANGE UP (ref 4.8–10.8)
WBC # FLD AUTO: 8.95 K/UL — SIGNIFICANT CHANGE UP (ref 4.8–10.8)

## 2020-12-30 PROCEDURE — 71045 X-RAY EXAM CHEST 1 VIEW: CPT | Mod: 26

## 2020-12-30 PROCEDURE — 93010 ELECTROCARDIOGRAM REPORT: CPT

## 2020-12-30 PROCEDURE — 92960 CARDIOVERSION ELECTRIC EXT: CPT

## 2020-12-30 PROCEDURE — 93970 EXTREMITY STUDY: CPT | Mod: 26

## 2020-12-30 PROCEDURE — 99233 SBSQ HOSP IP/OBS HIGH 50: CPT

## 2020-12-30 RX ORDER — AMIODARONE HYDROCHLORIDE 400 MG/1
200 TABLET ORAL DAILY
Refills: 0 | Status: DISCONTINUED | OUTPATIENT
Start: 2020-12-30 | End: 2020-12-31

## 2020-12-30 RX ORDER — METOPROLOL TARTRATE 50 MG
50 TABLET ORAL
Refills: 0 | Status: DISCONTINUED | OUTPATIENT
Start: 2020-12-30 | End: 2020-12-31

## 2020-12-30 RX ORDER — REGADENOSON 0.08 MG/ML
0.4 INJECTION, SOLUTION INTRAVENOUS ONCE
Refills: 0 | Status: DISCONTINUED | OUTPATIENT
Start: 2020-12-30 | End: 2020-12-31

## 2020-12-30 RX ORDER — APIXABAN 2.5 MG/1
2 TABLET, FILM COATED ORAL
Qty: 80 | Refills: 0
Start: 2020-12-30 | End: 2021-01-03

## 2020-12-30 RX ADMIN — AMIODARONE HYDROCHLORIDE 200 MILLIGRAM(S): 400 TABLET ORAL at 05:04

## 2020-12-30 RX ADMIN — Medication 40 MILLIGRAM(S): at 17:08

## 2020-12-30 RX ADMIN — Medication 50 MILLIGRAM(S): at 17:08

## 2020-12-30 RX ADMIN — Medication 60 MILLIGRAM(S): at 05:04

## 2020-12-30 RX ADMIN — CHLORHEXIDINE GLUCONATE 1 APPLICATION(S): 213 SOLUTION TOPICAL at 05:04

## 2020-12-30 RX ADMIN — Medication 40 MILLIGRAM(S): at 05:04

## 2020-12-30 RX ADMIN — APIXABAN 10 MILLIGRAM(S): 2.5 TABLET, FILM COATED ORAL at 17:08

## 2020-12-30 RX ADMIN — Medication 25 MILLIGRAM(S): at 05:04

## 2020-12-30 RX ADMIN — APIXABAN 10 MILLIGRAM(S): 2.5 TABLET, FILM COATED ORAL at 05:04

## 2020-12-30 NOTE — DISCHARGE NOTE NURSING/CASE MANAGEMENT/SOCIAL WORK - PATIENT PORTAL LINK FT
You can access the FollowMyHealth Patient Portal offered by [facility] by registering at the following website: [email or web address]. By joining [facility]’s FollowMyHealth portal, you will also be able to view your health information using other applications (apps) compatible with our system.

## 2020-12-30 NOTE — PACU DISCHARGE NOTE - COMMENTS
71y/o F s/p cardioversion. No anesthesia related complications.     HR: 63   BP: 93/58   SpO2: 98%   RR: 16

## 2020-12-30 NOTE — PRE-ANESTHESIA EVALUATION ADULT - NSANTHOSAYNRD_GEN_A_CORE
No. BECCA screening performed.  STOP BANG Legend: 0-2 = LOW Risk; 3-4 = INTERMEDIATE Risk; 5-8 = HIGH Risk
No. BECCA screening performed.  STOP BANG Legend: 0-2 = LOW Risk; 3-4 = INTERMEDIATE Risk; 5-8 = HIGH Risk

## 2020-12-30 NOTE — PROGRESS NOTE ADULT - SUBJECTIVE AND OBJECTIVE BOX
TOYA MESA  70y Female    CHIEF COMPLAINT:    Patient is a 70y old  Female who presents with a chief complaint of Afib (30 Dec 2020 10:06)      INTERVAL HPI/OVERNIGHT EVENTS:    Patient seen and examined. S/P CV. Feels good. No palpitations. No sob. No cp    ROS: All other systems are negative.    Vital Signs:    T(F): 97.6 (20 @ 04:31), Max: 97.8 (20 @ 12:13)  HR: 67 (20 @ 09:37) (60 - 111)  BP: 107/57 (20 @ 04:31) (105/68 - 167/80)  RR: 18 (20 @ 04:31) (18 - 20)  SpO2: 92% (20 @ 09:33) (88% - 92%)  I&O's Summary    29 Dec 2020 07:  -  30 Dec 2020 07:00  --------------------------------------------------------  IN: 423.4 mL / OUT: 400 mL / NET: 23.4 mL    30 Dec 2020 07:  -  30 Dec 2020 10:54  --------------------------------------------------------  IN: 0 mL / OUT: 0 mL / NET: 0 mL      Daily     Daily Weight in k.8 (30 Dec 2020 04:31)  CAPILLARY BLOOD GLUCOSE          PHYSICAL EXAM:    GENERAL:  NAD  SKIN: No rashes or lesions  HENT: Atraumatic Normocephalic. PERRL. Moist membranes.  NECK: Supple, No JVD. No lymphadenopathy.  PULMONARY: CTA B/L. No wheezing. No rales  CVS: Normal S1, S2. Rate and Rhythm are regular. No murmurs.  ABDOMEN/GI: Soft, Nontender, Nondistended; BS present  EXTREMITIES: Peripheral pulses intact. No edema B/L LE.  NEUROLOGIC:  No motor or sensory deficit.  PSYCH: Alert & oriented x 3    Consultant(s) Notes Reviewed:  [x ] YES  [ ] NO  Care Discussed with Consultants/Other Providers [ x] YES  [ ] NO    EKG reviewed  Telemetry reviewed    LABS:                        16.0   8.95  )-----------( 352      ( 30 Dec 2020 04:30 )             49.6     12-30    140  |  98  |  17  ----------------------------<  131<H>  4.2   |  28  |  0.8    Ca    10.2<H>      30 Dec 2020 04:30  Mg     2.0     12-30    TPro  6.8  /  Alb  4.1  /  TBili  0.6  /  DBili  x   /  AST  16  /  ALT  16  /  AlkPhos  86  12      Serum Pro-Brain Natriuretic Peptide: 4388 pg/mL (20 @ 12:56)          RADIOLOGY & ADDITIONAL TESTS:    < from: CT Heart without Coronaries w/ IV Cont (20 @ 10:56) >  IMPRESSION:    No CTA evidence of left heart thrombus.    Subsegmental pulmonary embolus seen in bilateral lower lobes as described above with a consolidative opacity distal to the right lower lobe thrombus which could reflect an infarcted area. Alternatively, infection is on the differential.    Right greater than left bilateral pleural effusions with adjacent consolidation/atelectasis. Superimposed infection not excluded in the appropriate clinical setting.    Small pericardial effusion. Multichamber cardiomegaly.    < end of copied text >    Imaging or report Personally Reviewed:  [ ] YES  [ ] NO    Medications:  Standing  aMIOdarone    Tablet 200 milliGRAM(s) Oral daily  apixaban 10 milliGRAM(s) Oral every 12 hours  chlorhexidine 4% Liquid 1 Application(s) Topical <User Schedule>  furosemide   Injectable 40 milliGRAM(s) IV Push every 12 hours  metoprolol tartrate 50 milliGRAM(s) Oral two times a day  regadenoson Injectable 0.4 milliGRAM(s) IV Push once    PRN Meds      Case discussed with resident    Care discussed with pt/family

## 2020-12-30 NOTE — PROGRESS NOTE ADULT - ASSESSMENT
69y/o F with PMH of Afib with hx of ablation not on AC presents with palpitations and diarrhea. Pt admits to having daily diarrhea for >3wks and on questioning thinks she had some intermittent palpitations for past few days.      Persistent A-Fib wit RVR S/P CV in NSR now  Acute HFpEF  H/O ablation for A-Fib  Diarrhea - resolved  Acute B/L subsegmental PE - unprovoked.   No L atrial thrombus         PLAN:    ·	S/P CV. In NSR now. Care D/W the EP. Recommended nuclear stress test prior to D/C  ·	Cont Amiodarone 200 mg po daily as per EP  ·	D/C Cardizem. Increase Metoprolol to 50 mg po q 12h  ·	CCTA reviewed. No atrial clot. B/L subsegmental PE. Unprovoked. Will need hypercoagulable state W/U. Can be done as an out pt.   ·	ECHO reviewed. EF is 45-50%  ·	Eliquis 10 mg po q 12h for one week, then 5 mg po q 12h  ·	Cont Lasix 40 mg ivp q 12h  ·	Check i's and o's and daily wt  ·	Low salt diet and water restriction to 1.5 L/D  ·	Check RA POX on exertion and rest.

## 2020-12-30 NOTE — CHART NOTE - NSCHARTNOTEFT_GEN_A_CORE
ELECTROPHYSIOLOGY PROCEDURE:  Direct Current Cardioversion  IMPRESSION:  Successful Direct Current Cardioversion.  PLAN:   - Continue anticoagulation  - Continue Amiodarone 200 mg PO q24 hr  - DC Cardizem  - Increase Metoprolol to 50 mg PO q12h  - Possible stress test today  - Anticipate DC later today.      : Odell Watt M.D.  INDICATION FOR PROCEDURE: Persistent Atrial Fibrillation    CONSENT:   The risks, benefits, indications and alternatives to the procedure were explained in detail to the patient and available family members prior to the procedure. The patient and available family members expressed a good understanding of the procedure and risks. All questions asked were answered and consent was obtained.     SEDATION:   The patient was transported to the Procedure room in a non-sedated state and was placed supine on the bed. Sedation was provided by anesthesia team. The patient underwent continuous blood pressure, heart rate and O2 saturation monitoring throughout the procedure with supplemental oxygen utilized as needed.     DETAILS OF PROCEDURE:  Informed consent was obtained, and the patient was brought to the procedure room in a fasting state. The patient  was prepped in the usual fashion.   After adequate sedation, DCCV was performed with synchronized 200 J, which converted him to sinus rhythm. 12-lead EKG confirmed it.  The patient was transferred to the room in stable condition.    IMPRESSION:  Successful Direct Current Cardioversion.

## 2020-12-30 NOTE — PRE-ANESTHESIA EVALUATION ADULT - NSANTHPMHFT_GEN_ALL_CORE
71 y/o F with PMHx of HTN, CHF (EF: 45%), atrial fibrillation admitted  with atrial fibrillation and CHF exacerbation.

## 2020-12-30 NOTE — CHART NOTE - NSCHARTNOTEFT_GEN_A_CORE
Electrophysiology Brief Post-Op Note    I have personally seen and examined the patient.  I agree with the history and physical which I have reviewed and noted any changes below. A clinical representative was present during the case for clinical support- consent taken from the patient. 12-30-20 @ 08:21    PRE-OP DIAGNOSIS: Persistent AF    POST-OP DIAGNOSIS: Same    PROCEDURE: Cardioversion    Physician: Odell Watt MD  Assistant: None    ESTIMATED BLOOD LOSS:   0   mL    ANESTHESIA TYPE:  [  ] General Anesthesia  [ x ] Sedation  [  ] Local/Regional    CONDITION  [  ] Critical  [  ] Serious  [  ] Fair  [ x ] Good      SPECIMENS REMOVED (IF APPLICABLE): None    IMPLANTS (IF APPLICABLE):   FINDINGS: Successful cardioversion 360 J DCCV to sinus.      COMPLICATIONS: None    PLAN OF CARE    - Continue anticoagulation  - Continue Amiodarone 200 mg PO q24hr  - DC cardizem  - Increase Metoprolol 50 mg PO q12hr  - Stress test today  - Anticipate DC later today      Odell Watt MD   Electrophysiology Attending

## 2020-12-30 NOTE — PROGRESS NOTE ADULT - SUBJECTIVE AND OBJECTIVE BOX
Patient Information:  TOYA MESA / 70y / Female / MRN#:[id number]    Hospital Day: 4d    Interval History:  Patient seen and examined at bedside. Pt had desaturated to 88% overnight, remains on O2 NC 2L  Pt is s/p cardioversion with EP this morning, doing well, denies any complaints.    Past Medical History:  Afib      Past Surgical History:  H/O prior ablation treatment      Allergies:  No Known Allergies    Medications:  PRN:    Standing:  aMIOdarone    Tablet 200 milliGRAM(s) Oral daily  apixaban 10 milliGRAM(s) Oral every 12 hours  chlorhexidine 4% Liquid 1 Application(s) Topical <User Schedule>  furosemide   Injectable 40 milliGRAM(s) IV Push every 12 hours  metoprolol tartrate 50 milliGRAM(s) Oral two times a day  regadenoson Injectable 0.4 milliGRAM(s) IV Push once    Home:    Vitals:  T(C): 36.4, Max: 36.6 (12-29-20 @ 12:13)  T(F): 97.6, Max: 97.8 (12-29-20 @ 12:13)  HR: 75 (60 - 111)  BP: 107/57 (105/68 - 167/80)  RR: 18 (18 - 20)  SpO2: 92% (88% - 92%)    Physical Exam:  General: Awake, alert, NAD, resting comfortably in bed, on O2 NC 2L  HEENT: Head NC/AT  Heart: irregularly irregular  Lungs: Dec breath sounds in bilateral bases  Abdomen: Soft, nontender, nondistended, BS+  Extremities: No edema, clubbing, cyanosis in upper or lower extremities  Neuro: AAOx3, NFD    Labs:  CBC (12-30 @ 04:30)                        Hgb: 16.0   WBC: 8.95  )-----------------( Plts: 352                              Hct: 49.6     Chem (12-30 @ 04:30)  Na: 140  |     Cl: 98     |  BUN: 17  -----------------------------------------< Gluc: 131    K: 4.2   |    HCO3: 28  |  Cr: 0.8    Ca 10.2 (12-30 @ 04:30)  Mg 2.0 (12-30 @ 04:30)    LFTs (12-30 @ 04:30)  TPro 6.8  /  Alb 4.1  TBili 0.6  /  DBili     AST 16  /  ALT 16  /  AlkPhos 86    Cardiac Markers (12-26 @ 12:56)  Troponin I X  Troponin T <0.01  CK X  CKMB X  CKMB Units X  Myoglobin X  Lactate X  ESR X            Microbiology:  Culture - Blood (collected 12-27 @ 06:15)  Source: .Blood None  Preliminary Report (12-28 @ 17:02):    No growth to date.       Patient Information:  TOYA MESA / 70y / Female / MRN#:[id number]    Hospital Day: 4d    Interval History:  Patient seen and examined at bedside. Pt had desaturated to 88% overnight, remains on O2 NC 2L  Pt is s/p cardioversion with EP this morning, doing well, denies any complaints.    Past Medical History:  Afib      Past Surgical History:  H/O prior ablation treatment      Allergies:  No Known Allergies    Medications:  PRN:    Standing:  aMIOdarone    Tablet 200 milliGRAM(s) Oral daily  apixaban 10 milliGRAM(s) Oral every 12 hours  chlorhexidine 4% Liquid 1 Application(s) Topical <User Schedule>  furosemide   Injectable 40 milliGRAM(s) IV Push every 12 hours  metoprolol tartrate 50 milliGRAM(s) Oral two times a day  regadenoson Injectable 0.4 milliGRAM(s) IV Push once    Home:    Vitals:  T(C): 36.4, Max: 36.6 (12-29-20 @ 12:13)  T(F): 97.6, Max: 97.8 (12-29-20 @ 12:13)  HR: 75 (60 - 111)  BP: 107/57 (105/68 - 167/80)  RR: 18 (18 - 20)  SpO2: 92% (88% - 92%)    Physical Exam:  General: Awake, alert, NAD, resting comfortably in bed, on O2 NC 2L  HEENT: Head NC/AT  Heart: RRR, no murmurs apprec  Lungs: Dec breath sounds in bilateral bases  Abdomen: Soft, nontender, nondistended, BS+  Extremities: No edema, clubbing, cyanosis in upper or lower extremities  Neuro: AAOx3, NFD    Labs:  CBC (12-30 @ 04:30)                        Hgb: 16.0   WBC: 8.95  )-----------------( Plts: 352                              Hct: 49.6     Chem (12-30 @ 04:30)  Na: 140  |     Cl: 98     |  BUN: 17  -----------------------------------------< Gluc: 131    K: 4.2   |    HCO3: 28  |  Cr: 0.8    Ca 10.2 (12-30 @ 04:30)  Mg 2.0 (12-30 @ 04:30)    LFTs (12-30 @ 04:30)  TPro 6.8  /  Alb 4.1  TBili 0.6  /  DBili     AST 16  /  ALT 16  /  AlkPhos 86    Cardiac Markers (12-26 @ 12:56)  Troponin I X  Troponin T <0.01  CK X  CKMB X  CKMB Units X  Myoglobin X  Lactate X  ESR X            Microbiology:  Culture - Blood (collected 12-27 @ 06:15)  Source: .Blood None  Preliminary Report (12-28 @ 17:02):    No growth to date.

## 2020-12-30 NOTE — PROGRESS NOTE ADULT - ASSESSMENT
71 yo F with PMHx of AFib, hx of cardioversion 3 yrs ago, not on any medications presented to ED with palpitation, hx of diarrhea for several weeks, found to be in rapid AFib with RVR, scheduled for DCCV with EP today; also discovered to have bilateral pleural effusions on admission, requiring O2 NC 2L.    #Acute hypoxemic respiratory failure due to acute HF (EF 45-50%) exacerbation  #Acute onset of rapid AFib with RVR likely contributed to by acute HF exacerbation  #Bilateral small subsegmental PE, HD stable, remains on O2 NC 2L - will wean down as tolerated  - CXR on admission revealed small bilateral pleural effusions, BNP 4388, Troponin wnl  - Pt was requiring O2 NC 2L, CXR reveals bilateral pleural effusions  - C/w IV Lasix 40 mg BID   - CHADSVASC 1  - TSH wnl, Lyme titers negative  - EP following - s/p cardioversion 12/30 for AFib  - C/w Metoprolol 50 mg q12, Amio 200 mg qd per EP; will likely be needed stress test as outpt  - Small L sided subsegmental PE, small R sided subsegmental PE with consolidation possibly reflecting developing infarction identified on CT - will c/w AC and inc Eliquis to 10 mg BID for 7 days    #Diarrhea prior to admission - now resolved  - Denies any abdominal pain or discomfort    DVT ppx: Eliquis  Diet: Regular  Activity: AAT  Full code  Dispo: acute, on IV diuresis       69 yo F with PMHx of AFib, hx of cardioversion 3 yrs ago, not on any medications presented to ED with palpitation, hx of diarrhea for several weeks, found to be in rapid AFib with RVR, scheduled for DCCV with EP today; also discovered to have bilateral pleural effusions on admission, requiring O2 NC 2L.    #Acute hypoxemic respiratory failure due to acute HF (EF 45-50%) exacerbation  #Acute onset of rapid AFib with RVR likely contributed to by acute HF exacerbation  (CHADSVASC 3)  #Bilateral small subsegmental PE, HD stable, remains on O2 NC 2L - will wean down as tolerated  - CXR on admission revealed small bilateral pleural effusions, BNP 4388, Troponin wnl  - Pt was requiring O2 NC 2L, CXR reveals bilateral pleural effusions  - C/w IV Lasix 40 mg BID, will add Metolazone  - Pulm c/s for further recommendations  - TSH wnl, Lyme titers negative  - EP following - s/p cardioversion 12/30 for AFib  - C/w Metoprolol 50 mg q12, Amio 200 mg qd per EP; will likely be needed stress test as outpt  - Small L sided subsegmental PE, small R sided subsegmental PE with consolidation possibly reflecting developing infarction identified on CT - will c/w AC and inc Eliquis to 10 mg BID for 7 days    #Diarrhea prior to admission - now resolved  - Denies any abdominal pain or discomfort    DVT ppx: Eliquis  Diet: Regular  Activity: AAT  Full code  Dispo: acute, on IV diuresis       69 yo F with PMHx of AFib, hx of cardioversion 3 yrs ago, not on any medications presented to ED with palpitation, hx of diarrhea for several weeks, found to be in rapid AFib with RVR, scheduled for DCCV with EP today; also discovered to have bilateral pleural effusions on admission, requiring O2 NC 2L.    #Acute hypoxemic respiratory failure due to acute HF (EF 45-50%) exacerbation  #Acute onset of rapid AFib with RVR likely contributed to by acute HF exacerbation  (CHADSVASC 3)  #Bilateral small subsegmental PE, HD stable, remains on O2 NC 2L - will wean down as tolerated  - CXR on admission revealed small bilateral pleural effusions, BNP 4388, Troponin wnl  - Pt was requiring O2 NC 2L, CXR reveals bilateral pleural effusions  - C/w IV Lasix 40 mg BID, will add Metolazone  - Pulm c/s for further recommendations  - TSH wnl, Lyme titers negative  - EP following - s/p cardioversion 12/30 for AFib  - C/w Metoprolol 50 mg q12, Amio 200 mg qd per EP; will likely be needed stress test as outpt  - Small L sided subsegmental PE, small R sided subsegmental PE with consolidation possibly reflecting developing infarction identified on CT - will c/w AC and inc Eliquis to 10 mg BID for 7 days      Despite treatment with (IV lasix) this pt is still hypoxic due to CHF  pt pulse ox on RA at rest is 88%  Pt pulse ox on RA with ambulation 85%  Pt pulse ox on 4L Nasal cannula with ambulation 96%  patient is aware she will be going home on oxygen. patient was tested in chronic stable state.   Pt is good with pulse dose for a portable concentrator       #Diarrhea prior to admission - now resolved  - Denies any abdominal pain or discomfort    DVT ppx: Eliquis  Diet: Regular  Activity: AAT  Full code  Dispo: acute, on IV diuresis

## 2020-12-30 NOTE — CONSULT NOTE ADULT - ATTENDING COMMENTS
70, F, Persistent AF s/p ILR, ? CAD, no recent follow-up with physicians due to social constraints, now admitted for acute diarrhea and AF/RVR.  Diarrhea has resolved, but patient remains in AF/RVR. + Recent increase in fatigue.    ## Persistent AF w/RVR  ## Systolic Dysfunction    - CHADSVASC: 3+ (Age, Gender, CAD?). Additionally, she needs cardioversion. Start her on Eliquis  - IV fluid. If no reduction in HR, will need ELIO/DCCV  - Start her on Amiodarone to maintain sinus after cardioversion  - Change cardizem to short acting for now, will need long-acting after DCCV.    Addendum: 12/28    - ELIO/DCCV today  - Start Amiodarone 200 mg PO q12hr for 1 week, followed by 200 mg PO q24 hr  - Continue Eliquis  - Cardizem  mg  - Patient can be discharged after successful cardioversion  - Outpatient follow-up in 1 month
Acute Self limited diarrhea.
Patient seen and examined, agree with above,  A fib/ CHF/ PE/ still low pox, all over feels better, want to go home, keep diuresis, NOAC, PFT OP

## 2020-12-30 NOTE — CONSULT NOTE ADULT - ASSESSMENT
Impression  Low risk PE unprovoked  b/l pleural effusion likely from CHF      Plan:  continue eliquis  continue diuresis  hypercoagulable workup OP  f/u hemeonc  aspiration precaution   Impression  Low risk PE unprovoked  b/l pleural effusion likely from CHF left greater than right      Plan:  continue eliquis  continue diuresis  hypercoagulable workup OP  f/u hemeonc  aspiration precaution   Impression  Low risk PE unprovoked  b/l pleural effusion likely from CHF left greater than right  AFIB      Plan:  continue eliquis  continue diuresis  hypercoagulable workup OP  f/u hemeonc  aspiration precaution Impression  Volume overload  Bilateral pleural effusions, L>R   HO HFrEF  HO Afib    Plan:  Possible thora if no improvement despite medical therapy  Therapeutic lovenox for now  Diurese as tolerated  Optimize cardiac therapy  Strict I's and O's  OP pulm FU  OP heme onc FU for hypercoagulable workup  HOB at 30  Aspiration precautions   Impression    Acute hypoxemic reso failure/ Volume overload  Bilateral pleural effusions, L>R   HO HFrEF/ pul HTN  HO Afib  PE    Plan:    Keep diuresis  No thora  Abdifatah  needs CT angio/ ABG  Cardio f/up  patient insist want to go home and f/up as OP

## 2020-12-31 ENCOUNTER — TRANSCRIPTION ENCOUNTER (OUTPATIENT)
Age: 70
End: 2020-12-31

## 2020-12-31 VITALS — OXYGEN SATURATION: 93 % | RESPIRATION RATE: 18 BRPM

## 2020-12-31 PROBLEM — I48.91 UNSPECIFIED ATRIAL FIBRILLATION: Chronic | Status: ACTIVE | Noted: 2020-12-26

## 2020-12-31 LAB
ALBUMIN SERPL ELPH-MCNC: 4.1 G/DL — SIGNIFICANT CHANGE UP (ref 3.5–5.2)
ALP SERPL-CCNC: 90 U/L — SIGNIFICANT CHANGE UP (ref 30–115)
ALT FLD-CCNC: 17 U/L — SIGNIFICANT CHANGE UP (ref 0–41)
ANION GAP SERPL CALC-SCNC: 20 MMOL/L — HIGH (ref 7–14)
AST SERPL-CCNC: 18 U/L — SIGNIFICANT CHANGE UP (ref 0–41)
BASOPHILS # BLD AUTO: 0.09 K/UL — SIGNIFICANT CHANGE UP (ref 0–0.2)
BASOPHILS NFR BLD AUTO: 1.2 % — HIGH (ref 0–1)
BILIRUB SERPL-MCNC: 0.7 MG/DL — SIGNIFICANT CHANGE UP (ref 0.2–1.2)
BUN SERPL-MCNC: 18 MG/DL — SIGNIFICANT CHANGE UP (ref 10–20)
CALCIUM SERPL-MCNC: 10 MG/DL — SIGNIFICANT CHANGE UP (ref 8.5–10.1)
CHLORIDE SERPL-SCNC: 91 MMOL/L — LOW (ref 98–110)
CO2 SERPL-SCNC: 24 MMOL/L — SIGNIFICANT CHANGE UP (ref 17–32)
CREAT SERPL-MCNC: 0.8 MG/DL — SIGNIFICANT CHANGE UP (ref 0.7–1.5)
EOSINOPHIL # BLD AUTO: 0.24 K/UL — SIGNIFICANT CHANGE UP (ref 0–0.7)
EOSINOPHIL NFR BLD AUTO: 3.2 % — SIGNIFICANT CHANGE UP (ref 0–8)
GLUCOSE SERPL-MCNC: 89 MG/DL — SIGNIFICANT CHANGE UP (ref 70–99)
HCT VFR BLD CALC: 50.5 % — HIGH (ref 37–47)
HGB BLD-MCNC: 16.3 G/DL — HIGH (ref 12–16)
IMM GRANULOCYTES NFR BLD AUTO: 0.3 % — SIGNIFICANT CHANGE UP (ref 0.1–0.3)
LYMPHOCYTES # BLD AUTO: 1.51 K/UL — SIGNIFICANT CHANGE UP (ref 1.2–3.4)
LYMPHOCYTES # BLD AUTO: 19.9 % — LOW (ref 20.5–51.1)
MAGNESIUM SERPL-MCNC: 1.9 MG/DL — SIGNIFICANT CHANGE UP (ref 1.8–2.4)
MCHC RBC-ENTMCNC: 27.6 PG — SIGNIFICANT CHANGE UP (ref 27–31)
MCHC RBC-ENTMCNC: 32.3 G/DL — SIGNIFICANT CHANGE UP (ref 32–37)
MCV RBC AUTO: 85.4 FL — SIGNIFICANT CHANGE UP (ref 81–99)
MONOCYTES # BLD AUTO: 0.8 K/UL — HIGH (ref 0.1–0.6)
MONOCYTES NFR BLD AUTO: 10.5 % — HIGH (ref 1.7–9.3)
NEUTROPHILS # BLD AUTO: 4.94 K/UL — SIGNIFICANT CHANGE UP (ref 1.4–6.5)
NEUTROPHILS NFR BLD AUTO: 64.9 % — SIGNIFICANT CHANGE UP (ref 42.2–75.2)
NRBC # BLD: 0 /100 WBCS — SIGNIFICANT CHANGE UP (ref 0–0)
PLATELET # BLD AUTO: 333 K/UL — SIGNIFICANT CHANGE UP (ref 130–400)
POTASSIUM SERPL-MCNC: 4.1 MMOL/L — SIGNIFICANT CHANGE UP (ref 3.5–5)
POTASSIUM SERPL-SCNC: 4.1 MMOL/L — SIGNIFICANT CHANGE UP (ref 3.5–5)
PROT SERPL-MCNC: 7 G/DL — SIGNIFICANT CHANGE UP (ref 6–8)
RBC # BLD: 5.91 M/UL — HIGH (ref 4.2–5.4)
RBC # FLD: 13.1 % — SIGNIFICANT CHANGE UP (ref 11.5–14.5)
SODIUM SERPL-SCNC: 135 MMOL/L — SIGNIFICANT CHANGE UP (ref 135–146)
WBC # BLD: 7.6 K/UL — SIGNIFICANT CHANGE UP (ref 4.8–10.8)
WBC # FLD AUTO: 7.6 K/UL — SIGNIFICANT CHANGE UP (ref 4.8–10.8)

## 2020-12-31 PROCEDURE — 99239 HOSP IP/OBS DSCHRG MGMT >30: CPT

## 2020-12-31 PROCEDURE — 71045 X-RAY EXAM CHEST 1 VIEW: CPT | Mod: 26

## 2020-12-31 RX ORDER — FUROSEMIDE 40 MG
40 TABLET ORAL DAILY
Refills: 0 | Status: DISCONTINUED | OUTPATIENT
Start: 2021-01-01 | End: 2020-12-31

## 2020-12-31 RX ORDER — FUROSEMIDE 40 MG
1 TABLET ORAL
Qty: 0 | Refills: 0 | DISCHARGE
Start: 2020-12-31

## 2020-12-31 RX ORDER — METOPROLOL TARTRATE 50 MG
1 TABLET ORAL
Qty: 60 | Refills: 0
Start: 2020-12-31 | End: 2021-01-29

## 2020-12-31 RX ORDER — FUROSEMIDE 40 MG
1 TABLET ORAL
Qty: 30 | Refills: 0
Start: 2020-12-31 | End: 2021-01-29

## 2020-12-31 RX ORDER — APIXABAN 2.5 MG/1
2 TABLET, FILM COATED ORAL
Qty: 20 | Refills: 0
Start: 2020-12-31 | End: 2021-01-04

## 2020-12-31 RX ORDER — AMIODARONE HYDROCHLORIDE 400 MG/1
1 TABLET ORAL
Qty: 30 | Refills: 0
Start: 2020-12-31 | End: 2021-01-29

## 2020-12-31 RX ORDER — FUROSEMIDE 40 MG
1 TABLET ORAL
Qty: 60 | Refills: 0
Start: 2020-12-31 | End: 2021-01-29

## 2020-12-31 RX ADMIN — Medication 50 MILLIGRAM(S): at 05:24

## 2020-12-31 RX ADMIN — APIXABAN 10 MILLIGRAM(S): 2.5 TABLET, FILM COATED ORAL at 05:24

## 2020-12-31 RX ADMIN — AMIODARONE HYDROCHLORIDE 200 MILLIGRAM(S): 400 TABLET ORAL at 05:24

## 2020-12-31 RX ADMIN — Medication 40 MILLIGRAM(S): at 05:24

## 2020-12-31 NOTE — DISCHARGE NOTE PROVIDER - CARE PROVIDERS DIRECT ADDRESSES
,DirectAddress_Unknown,[email or web address],DirectAddress_Unknown

## 2020-12-31 NOTE — DISCHARGE NOTE PROVIDER - NSDCCPCAREPLAN_GEN_ALL_CORE_FT
PRINCIPAL DISCHARGE DIAGNOSIS  Diagnosis: Atrial fibrillation with RVR  Assessment and Plan of Treatment: You presented to the hospital and were found to have atrial fibrillation. You were started on a blood thinner and the EP doctor performed a cardioversion. Your heart rate is controlled now, but you must continue taking blood thinners for life. You must also take Metoprolol 50 mg twice per day and Amiodarone 200 mg every day.   Be sure to follow up with the Cardiologist and EP doctor.      SECONDARY DISCHARGE DIAGNOSES  Diagnosis: Multiple subsegmental pulmonary emboli without acute cor pulmonale  Assessment and Plan of Treatment: You were found to have blood clots in your lungs. They are small and not significantly affecting your breathing and your heart function. However, you must take an increased dose of blood thinners for this reason. Take Eliquis 10 mg twice per day for 5 days, from 12/31 until 1/4. Starting 1/5, take Eliquis 5 mg twice per day.  Also be sure to follow up with a hematology doctor for workup to determine the reason for your blood clots.    Diagnosis: Acute exacerbation of CHF (congestive heart failure)  Assessment and Plan of Treatment: Congestive Heart Failure (CHF)  Congestive heart failure is a chronic condition in which the heart has trouble pumping blood. In some cases of heart failure, fluid may back up into your lungs or you may have swelling (edema) in your lower legs. There are many causes of heart failure including high blood pressure, coronary artery disease, abnormal heart valves, heart muscle disease, lung disease, diabetes, etc. Symptoms include shortness of breath with activity or when lying flat, cough, swelling of the legs, fatigue, or increased urination during the night.   Treatment is aimed at managing the symptoms of heart failure and may include lifestyle changes, medications, or surgical procedures. Take medicines only as directed by your health care provider and do not stop unless instructed to do so. Eat heart-healthy foods with low or no trans/saturated fats, cholesterol and salt. Weigh yourself every day for early recognition of fluid accumulation.  SEEK IMMEDIATE MEDICAL CARE IF YOU HAVE ANY OF THE FOLLOWING SYMPTOMS: shortness of breath, change in mental status, chest pain, lightheadedness/dizziness/fainting, or worsening of symptoms including not being able to conduct normal physical activity.  Take Lasix 40 mg every day to help you get rid of the fluid in your lungs. Be sure to follow up with the Cardiologist to make sure that your heart and lungs are improving.

## 2020-12-31 NOTE — PROGRESS NOTE ADULT - PROVIDER SPECIALTY LIST ADULT
Hospitalist
Internal Medicine
Internal Medicine
Electrophysiology
Electrophysiology
Hospitalist
Internal Medicine
Internal Medicine

## 2020-12-31 NOTE — PROGRESS NOTE ADULT - SUBJECTIVE AND OBJECTIVE BOX
TOYA MESA  70y Female    CHIEF COMPLAINT:    Patient is a 70y old  Female who presents with a chief complaint of Afib (31 Dec 2020 09:54)      INTERVAL HPI/OVERNIGHT EVENTS:    Patient seen and examined.    ROS: All other systems are negative.    Vital Signs:    T(F): 97.1 (20 @ 04:50), Max: 97.1 (20 @ 04:50)  HR: 65 (20 @ 04:50) (60 - 78)  BP: 132/64 (20 @ 04:50) (112/60 - 132/64)  RR: 18 (20 @ 10:33) (18 - 18)  SpO2: 95% (20 @ 10:34) (87% - 95%)  I&O's Summary    30 Dec 2020 07:01  -  31 Dec 2020 07:00  --------------------------------------------------------  IN: 0 mL / OUT: 0 mL / NET: 0 mL      Daily     Daily Weight in k.4 (31 Dec 2020 04:50)  CAPILLARY BLOOD GLUCOSE          PHYSICAL EXAM:    GENERAL:  NAD  SKIN: No rashes or lesions  HENT: Atrumatic. Normocephalic. PERRL. Moist membranes.  NECK: Supple, No JVD. No lymphadenopathy.  PULMONARY: CTA B/L. No wheezing. No rales  CVS: Normal S1, S2. Rate and Rythm are regular. No murmurs.  ABDOMEN/GI: Soft, Nontender, Nondistended; BS present  EXTREMITIES: Peripheral pulses intact. No edema B/L LE.  NEUROLOGIC:  No motor or sensory deficit.  PSYCH: Alert & oriented x 3    Consultant(s) Notes Reviewed:  [x ] YES  [ ] NO  Care Discussed with Consultants/Other Providers [ x] YES  [ ] NO    EKG reviewed  Telemetry reviewed    LABS:                        16.3   7.60  )-----------( 333      ( 31 Dec 2020 05:44 )             50.5         135  |  91<L>  |  18  ----------------------------<  89  4.1   |  24  |  0.8    Ca    10.0      31 Dec 2020 05:44  Mg     1.9         TPro  7.0  /  Alb  4.1  /  TBili  0.7  /  DBili  x   /  AST  18  /  ALT  17  /  AlkPhos  90        Serum Pro-Brain Natriuretic Peptide: 4388 pg/mL (20 @ 12:56)    Trop <0.01, CKMB --, CK --, 20 @ 12:11        RADIOLOGY & ADDITIONAL TESTS:      Imaging or report Personally Reviewed:  [ ] YES  [ ] NO    Medications:  Standing  aMIOdarone    Tablet 200 milliGRAM(s) Oral daily  apixaban 10 milliGRAM(s) Oral every 12 hours  chlorhexidine 4% Liquid 1 Application(s) Topical <User Schedule>  metoprolol tartrate 50 milliGRAM(s) Oral two times a day    PRN Meds      Case discussed with resident    Care discussed with pt/family           TOYA MESA  70y Female    CHIEF COMPLAINT:    Patient is a 70y old  Female who presents with a chief complaint of Afib (31 Dec 2020 09:54)      INTERVAL HPI/OVERNIGHT EVENTS:    Patient seen and examined. Feels much better today. No cp. No palpitations. No sob at rest. Dropped her O2 sat to 88% on exertion on RA.     ROS: All other systems are negative.    Vital Signs:    T(F): 97.1 (20 @ 04:50), Max: 97.1 (20 @ 04:50)  HR: 65 (20 @ 04:50) (60 - 78)  BP: 132/64 (20 @ 04:50) (112/60 - 132/64)  RR: 18 (20 @ 10:33) (18 - 18)  SpO2: 95% (20 @ 10:34) (87% - 95%)  I&O's Summary    30 Dec 2020 07:01  -  31 Dec 2020 07:00  --------------------------------------------------------  IN: 0 mL / OUT: 0 mL / NET: 0 mL      Daily     Daily Weight in k.4 (31 Dec 2020 04:50)  CAPILLARY BLOOD GLUCOSE          PHYSICAL EXAM:    GENERAL:  NAD  SKIN: No rashes or lesions  HENT: Atraumatic Normocephalic. PERRL. Moist membranes.  NECK: Supple, No JVD. No lymphadenopathy.  PULMONARY:  Decreased BS in the bases B/L. No wheezing. No rales  CVS: Normal S1, S2. Rate and Rhythm are regular. No murmurs.  ABDOMEN/GI: Soft, Nontender, Nondistended; BS present  EXTREMITIES: Peripheral pulses intact. No edema B/L LE.  NEUROLOGIC:  No motor or sensory deficit.  PSYCH: Alert & oriented x 3    Consultant(s) Notes Reviewed:  [x ] YES  [ ] NO  Care Discussed with Consultants/Other Providers [ x] YES  [ ] NO    EKG reviewed  Telemetry reviewed    LABS:                        16.3   7.60  )-----------( 333      ( 31 Dec 2020 05:44 )             50.5         135  |  91<L>  |  18  ----------------------------<  89  4.1   |  24  |  0.8    Ca    10.0      31 Dec 2020 05:44  Mg     1.9         TPro  7.0  /  Alb  4.1  /  TBili  0.7  /  DBili  x   /  AST  18  /  ALT  17  /  AlkPhos  90        Serum Pro-Brain Natriuretic Peptide: 4388 pg/mL (20 @ 12:56)    Trop <0.01, CKMB --, CK --, 20 @ 12:11        RADIOLOGY & ADDITIONAL TESTS:      Imaging or report Personally Reviewed:  [ ] YES  [ ] NO    Medications:  Standing  aMIOdarone    Tablet 200 milliGRAM(s) Oral daily  apixaban 10 milliGRAM(s) Oral every 12 hours  chlorhexidine 4% Liquid 1 Application(s) Topical <User Schedule>  metoprolol tartrate 50 milliGRAM(s) Oral two times a day    PRN Meds      Case discussed with resident    Care discussed with pt/family

## 2020-12-31 NOTE — DISCHARGE NOTE PROVIDER - CARE PROVIDER_API CALL
Odell Watt)  Cardiac Electrophysiology; Cardiology; Internal Medicine  [address] [place] [address]  Phone: [phone]  Fax: [phone]  Follow Up Time: 1 week    Seng Canseco  CARDIOVASCULAR DISEASE  [address] [place] [address], [place] [address]  Phone: [phone]  Fax: [phone]  Follow Up Time: 1 week    Candido Fuchs  INTERNAL MEDICINE  [address] [place] [address]  Phone: [phone]  Fax: [phone]  Follow Up Time: 1 week    Gurwinder Roldan)  Critical Care Medicine; Internal Medicine; Pulmonary Disease; Sleep Medicine  [address] [place] [address]  Phone: [phone]  Fax: [phone]  Follow Up Time: 1 week

## 2020-12-31 NOTE — DISCHARGE NOTE PROVIDER - HOSPITAL COURSE
69y/o F with PMH of Afib with hx of ablation not on AC presents with palpitations and diarrhea. Pt admits to having daily diarrhea for >3wks and on questioning thinks she had some intermittent palpitations for past few days. She takes pepto-bismol "all day" to prevent the diarrhea - tried imodium but w/o success. She went to Urgent Care for diarrhea and was found incidentally to be in Afib with RVR. No CP, SOB, N/V, abd pain. Not on any other meds. On admission, pt's diarrhea had resolved with intervention.  In ED: T98,  > 84, /88, RR16, SpO2 96%. EKG Afib with RVR. Was given IV cardizem pushes w/o improvement, placed on cardizem drip, then improved and transitioned to Cardizem PO. Labs only remarkable for BNP 4388. COVID neg. Pt was initiated on IVF on admission and developed acute hypoxemic respiratory failure due to CHF exacerbation. She required O2 NC 2L. CXR revealed bilateral opacities. She was initiated on IV Lasix and Metolazone and improved. She was seen by EP, had ELIO done and findings suspicious for small atrial thrombus, CT heart ruled out atrial thrombus but revealed small bilateral subsegmental PEs. Initiated on Eliquis. Pt had cardioversion done, initiated on Amio, Metoprolol, taken off Cardizem. Pt improving after IV diuresis, oxygen requirements improved but will be discharged home with PO Lasix and home oxygen. Pt will require outpatient follow up with EP, Cardio, Pulm and Heme Onc for anticoagulation work up as PEs are unprovoked.

## 2020-12-31 NOTE — DISCHARGE NOTE PROVIDER - PROVIDER TOKENS
PROVIDER:[TOKEN:[[id number]],FOLLOWUP:[1 week]],PROVIDER:[TOKEN:[[id number]],FOLLOWUP:[1 week]],PROVIDER:[TOKEN:[[id number]],FOLLOWUP:[1 week]],PROVIDER:[TOKEN:[[id number]],FOLLOWUP:[1 week]]

## 2020-12-31 NOTE — DISCHARGE NOTE PROVIDER - NSDCMRMEDTOKEN_GEN_ALL_CORE_FT
amiodarone 200 mg oral tablet: 1 tab(s) orally once a day  Eliquis 5 mg oral tablet: 2 tab(s) orally 2 times a day for 5 days then 1 tablet twice  a day   Lasix 40 mg oral tablet: 1 tab(s) orally once a day  metOLazone 2.5 mg oral tablet: 1 tab(s) orally once a day  metoprolol tartrate 50 mg oral tablet: 1 tab(s) orally 2 times a day   amiodarone 200 mg oral tablet: 1 tab(s) orally once a day  Eliquis 5 mg oral tablet: 2 tab(s) orally 2 times a day for 5 days then 1 tablet twice  a day   Lasix 40 mg oral tablet: 1 tab(s) orally once a day  metoprolol tartrate 50 mg oral tablet: 1 tab(s) orally 2 times a day   amiodarone 200 mg oral tablet: 1 tab(s) orally once a day  Eliquis 5 mg oral tablet: 2 tab(s) orally 2 times a day for 5 days then 1 tablet twice  a day   Lasix 40 mg oral tablet: 1 tab(s) orally once a day  Lasix 40 mg oral tablet: 1 tab(s) orally once a day   metoprolol tartrate 50 mg oral tablet: 1 tab(s) orally 2 times a day

## 2020-12-31 NOTE — PROGRESS NOTE ADULT - ASSESSMENT
71y/o F with PMH of Afib with hx of ablation not on AC presents with palpitations and diarrhea. Pt admits to having daily diarrhea for >3wks and on questioning thinks she had some intermittent palpitations for past few days.      Persistent A-Fib wit RVR S/P CV in NSR now  Acute HFpEF  H/O ablation for A-Fib  Diarrhea - resolved  Acute B/L subsegmental PE - unprovoked.   No L atrial thrombus         PLAN:    ·	S/P CV. In NSR now. Care D/W the EP. Recommended nuclear stress test prior to D/C  ·	Cont Amiodarone 200 mg po daily as per EP  ·	D/C Cardizem. Increase Metoprolol to 50 mg po q 12h  ·	CCTA reviewed. No atrial clot. B/L subsegmental PE. Unprovoked. Will need hypercoagulable state W/U. Can be done as an out pt.   ·	ECHO reviewed. EF is 45-50%  ·	Eliquis 10 mg po q 12h for one week, then 5 mg po q 12h  ·	Cont Lasix 40 mg ivp q 12h  ·	Check i's and o's and daily wt  ·	Low salt diet and water restriction to 1.5 L/D  ·	Check RA POX on exertion and rest.  69y/o F with PMH of Afib with hx of ablation not on AC presents with palpitations and diarrhea. Pt admits to having daily diarrhea for >3wks and on questioning thinks she had some intermittent palpitations for past few days.      Persistent A-Fib wit RVR S/P CV in NSR now  Acute HFpEF  H/O ablation for A-Fib  Diarrhea - resolved  Acute B/L subsegmental PE - unprovoked.   No L atrial thrombus         PLAN:    ·	Dropped her O2 sat to 88% on RA on exertion. Arranged home O2  ·	Care D/W the pulmonary. Recommended out pt F/U  ·	S/P CV. In NSR now. Care D/W the EP. Out pt F/U with Dr. Watt  ·	Cont Amiodarone 200 mg po daily as per EP  ·	Cont Metoprolol to 50 mg po q 12h  ·	CCTA reviewed. No atrial clot. B/L subsegmental PE. Unprovoked. Will need hypercoagulable state W/U. Can be done as an out pt.   ·	ECHO reviewed. EF is 45-50%  ·	Eliquis 10 mg po q 12h for one week, then 5 mg po q 12h  ·	Switch her to Lasix mg po daily  ·	Low salt diet and water restriction to 1.5 L/D  ·	D/C home    * Med rec reviewed. Plan of care D/W the pt. Gave her appt with the [facility] on 1/6/2021 at 9:15 AM. Time spent 40 minutes.

## 2020-12-31 NOTE — CHART NOTE - NSCHARTNOTEFT_GEN_A_CORE
<<<RESIDENT DISCHARGE NOTE>>>     TYOA MESA  MRN-[id number]    VITAL SIGNS:  T(F): 97.1 (12-31-20 @ 04:50), Max: 97.1 (12-31-20 @ 04:50)  HR: 65 (12-31-20 @ 04:50)  BP: 132/64 (12-31-20 @ 04:50)  SpO2: 95% (12-31-20 @ 10:34)  Weight (kg): 85.5 (12-30-20 @ 14:57)  BMI (kg/m2): 25.6 (12-30-20 @ 14:57)    PHYSICAL EXAMINATION:  General: NAD, on RA, resting comfortably in bed  Pulmonary: Mild rales in bilateral bases  Cardiovascular: irregular rhythm  Gastrointestinal/Abdomen & Pelvis: Soft, nontender, nondistended  Neurologic/Motor: AAOx4    TEST RESULTS:                        16.3   7.60  )-----------( 333      ( 31 Dec 2020 05:44 )             50.5       12-31    135  |  91<L>  |  18  ----------------------------<  89  4.1   |  24  |  0.8    Ca    10.0      31 Dec 2020 05:44  Mg     1.9     12-31    TPro  7.0  /  Alb  4.1  /  TBili  0.7  /  DBili  x   /  AST  18  /  ALT  17  /  AlkPhos  90  12-31      FINAL DISCHARGE INTERVIEW:  Resident(s) Present: Astrid Garza    DISCHARGE MEDICATION RECONCILIATION  reviewed with Attending: Dr. Steiner    DISPOSITION:   [ x ] Home,    [  ] Home with Visiting Nursing Services,   [    ]  SNF/ NH,    [   ] Acute Rehab (4A),   [   ] Other (Specify:_________)

## 2021-01-01 LAB
CULTURE RESULTS: SIGNIFICANT CHANGE UP
SPECIMEN SOURCE: SIGNIFICANT CHANGE UP

## 2021-01-04 PROBLEM — Z00.00 ENCOUNTER FOR PREVENTIVE HEALTH EXAMINATION: Status: ACTIVE | Noted: 2021-01-04

## 2021-01-05 RX ORDER — APIXABAN 2.5 MG/1
1 TABLET, FILM COATED ORAL
Qty: 60 | Refills: 0
Start: 2021-01-05 | End: 2021-02-03

## 2021-01-06 ENCOUNTER — APPOINTMENT (OUTPATIENT)
Dept: GERIATRICS | Facility: CLINIC | Age: 71
End: 2021-01-06

## 2021-01-06 DIAGNOSIS — I27.20 PULMONARY HYPERTENSION, UNSPECIFIED: ICD-10-CM

## 2021-01-06 DIAGNOSIS — I50.31 ACUTE DIASTOLIC (CONGESTIVE) HEART FAILURE: ICD-10-CM

## 2021-01-06 DIAGNOSIS — I48.19 OTHER PERSISTENT ATRIAL FIBRILLATION: ICD-10-CM

## 2021-01-06 DIAGNOSIS — R00.2 PALPITATIONS: ICD-10-CM

## 2021-01-06 DIAGNOSIS — E86.0 DEHYDRATION: ICD-10-CM

## 2021-01-06 DIAGNOSIS — J96.01 ACUTE RESPIRATORY FAILURE WITH HYPOXIA: ICD-10-CM

## 2021-01-06 DIAGNOSIS — R19.7 DIARRHEA, UNSPECIFIED: ICD-10-CM

## 2021-01-06 DIAGNOSIS — I26.94 MULTIPLE SUBSEGMENTAL PULMONARY EMBOLI WITHOUT ACUTE COR PULMONALE: ICD-10-CM

## 2021-01-28 ENCOUNTER — APPOINTMENT (OUTPATIENT)
Dept: CARDIOLOGY | Facility: CLINIC | Age: 71
End: 2021-01-28
Payer: MEDICARE

## 2021-01-28 VITALS
WEIGHT: 160 LBS | DIASTOLIC BLOOD PRESSURE: 77 MMHG | BODY MASS INDEX: 37.03 KG/M2 | HEART RATE: 59 BPM | HEIGHT: 55 IN | SYSTOLIC BLOOD PRESSURE: 153 MMHG

## 2021-01-28 PROCEDURE — 93000 ELECTROCARDIOGRAM COMPLETE: CPT

## 2021-01-28 PROCEDURE — 99072 ADDL SUPL MATRL&STAF TM PHE: CPT

## 2021-01-28 PROCEDURE — 99214 OFFICE O/P EST MOD 30 MIN: CPT

## 2021-01-28 NOTE — PHYSICAL EXAM
[General Appearance - Well Developed] : well developed [Normal Appearance] : normal appearance [Well Groomed] : well groomed [General Appearance - Well Nourished] : well nourished [No Deformities] : no deformities [General Appearance - In No Acute Distress] : no acute distress [Normal Conjunctiva] : the conjunctiva exhibited no abnormalities [Eyelids - No Xanthelasma] : the eyelids demonstrated no xanthelasmas [Normal Oral Mucosa] : normal oral mucosa [No Oral Pallor] : no oral pallor [No Oral Cyanosis] : no oral cyanosis [Normal Jugular Venous A Waves Present] : normal jugular venous A waves present [Normal Jugular Venous V Waves Present] : normal jugular venous V waves present [No Jugular Venous Duran A Waves] : no jugular venous duran A waves [Respiration, Rhythm And Depth] : normal respiratory rhythm and effort [Exaggerated Use Of Accessory Muscles For Inspiration] : no accessory muscle use [Auscultation Breath Sounds / Voice Sounds] : lungs were clear to auscultation bilaterally [Heart Rate And Rhythm] : heart rate and rhythm were normal [Heart Sounds] : normal S1 and S2 [Murmurs] : no murmurs present [Abdomen Soft] : soft [Abdomen Tenderness] : non-tender [Abdomen Mass (___ Cm)] : no abdominal mass palpated [Gait - Sufficient For Exercise Testing] : the gait was sufficient for exercise testing [Abnormal Walk] : normal gait [Cyanosis, Localized] : no localized cyanosis [Nail Clubbing] : no clubbing of the fingernails [Petechial Hemorrhages (___cm)] : no petechial hemorrhages [Skin Color & Pigmentation] : normal skin color and pigmentation [] : no rash [No Venous Stasis] : no venous stasis [Skin Lesions] : no skin lesions [No Skin Ulcers] : no skin ulcer [No Xanthoma] : no  xanthoma was observed [Affect] : the affect was normal [Oriented To Time, Place, And Person] : oriented to person, place, and time [Mood] : the mood was normal [No Anxiety] : not feeling anxious

## 2021-01-28 NOTE — ASSESSMENT
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 On eliquis. compliant\par - Continue amiodarone to maintain sinus for now. We will re-eval change to other AADs after TTE is done\par - TTE to assess LVEF\par - MPI for cardiomyopathy\par - Will start losartan 50 mg for high BP and CM. BP diary at home. Education provided.\par - RTC in 3 months.

## 2021-01-28 NOTE — HISTORY OF PRESENT ILLNESS
[FreeTextEntry1] : I had a pleasure of seeing Ms. MESA for follow-up consultation for atrial fibrillation and cardiomyopathy. \par \par Ms. MESA is a 70 year-year old female with history of cardiomyopathy- likely non-ischemic, persistent atrial fibrillation s/p DCCV, b/l small PE emboli incidentally detected on CTA admitted for shortness on exertion-found to have AF/HF. She was treated medically + DCCV.\par \par Denies chest pain, shortness of breath, palpitation, dizziness or LOC.\par Feels significantly better.\par \par EKG: SR @ 78/min\par Cardio: None\par

## 2021-02-03 RX ORDER — METOPROLOL TARTRATE 25 MG/1
25 TABLET, FILM COATED ORAL TWICE DAILY
Qty: 60 | Refills: 0 | Status: DISCONTINUED | OUTPATIENT
End: 2021-02-03

## 2021-02-04 RX ORDER — METOPROLOL TARTRATE 50 MG/1
50 TABLET, FILM COATED ORAL
Qty: 2 | Refills: 0 | Status: DISCONTINUED | COMMUNITY

## 2021-02-25 ENCOUNTER — RX RENEWAL (OUTPATIENT)
Age: 71
End: 2021-02-25

## 2021-04-29 ENCOUNTER — APPOINTMENT (OUTPATIENT)
Dept: CARDIOLOGY | Facility: CLINIC | Age: 71
End: 2021-04-29
Payer: MEDICARE

## 2021-04-29 DIAGNOSIS — I48.19 OTHER PERSISTENT ATRIAL FIBRILLATION: ICD-10-CM

## 2021-04-29 PROCEDURE — 93306 TTE W/DOPPLER COMPLETE: CPT

## 2021-04-29 PROCEDURE — 99072 ADDL SUPL MATRL&STAF TM PHE: CPT

## 2021-05-05 ENCOUNTER — APPOINTMENT (OUTPATIENT)
Dept: CARDIOLOGY | Facility: CLINIC | Age: 71
End: 2021-05-05
Payer: MEDICARE

## 2021-05-05 ENCOUNTER — RESULT REVIEW (OUTPATIENT)
Age: 71
End: 2021-05-05

## 2021-05-05 VITALS
RESPIRATION RATE: 16 BRPM | BODY MASS INDEX: 27.94 KG/M2 | SYSTOLIC BLOOD PRESSURE: 124 MMHG | DIASTOLIC BLOOD PRESSURE: 68 MMHG | WEIGHT: 178 LBS | OXYGEN SATURATION: 98 % | HEART RATE: 59 BPM | TEMPERATURE: 97.9 F | HEIGHT: 67 IN

## 2021-05-05 PROCEDURE — 93000 ELECTROCARDIOGRAM COMPLETE: CPT

## 2021-05-05 PROCEDURE — 99214 OFFICE O/P EST MOD 30 MIN: CPT

## 2021-05-05 PROCEDURE — 99072 ADDL SUPL MATRL&STAF TM PHE: CPT

## 2021-05-05 RX ORDER — FUROSEMIDE 40 MG/1
40 TABLET ORAL DAILY
Qty: 30 | Refills: 5 | Status: DISCONTINUED | COMMUNITY
End: 2021-05-05

## 2021-05-05 RX ORDER — AMIODARONE HYDROCHLORIDE 200 MG/1
200 TABLET ORAL
Qty: 60 | Refills: 3 | Status: DISCONTINUED | OUTPATIENT
End: 2021-05-05

## 2021-05-05 NOTE — HISTORY OF PRESENT ILLNESS
[FreeTextEntry1] : I had a pleasure of seeing Ms. MESA for follow-up consultation for atrial fibrillation and cardiomyopathy. \par \par Ms. MESA is a 70 year-year old female with history of cardiomyopathy- likely non-ischemic, persistent atrial fibrillation s/p DCCV, b/l small PE emboli incidentally detected on CTA admitted for shortness on exertion-found to have AF/HF. She was treated medically + DCCV.\par \par 5/5: Denies chest pain, shortness of breath, palpitation, dizziness or LOC.\par Feels significantly better.\par Didnt do Stress test because of confusion on her part and radiology dept.\par \par EKG: SR @ 59/min\par TTE (04/21): Nl EF, mild LAE\par Cardio: None\par

## 2021-05-05 NOTE — ASSESSMENT
[FreeTextEntry1] : ## Persistent AF s/p ELIO/DCCV\par ## Suspected SANAM thrombus\par ## Cardiomyopathy -likely non-ischemic- recovered EF\par ## HTN\par \par - Feels better.\par - CHADSVASC: 4+ (Age, Gender, HTN, Cardiomyopathy). On eliquis. compliant\par - MPI for cardiomyopathy\par - DC amiodarone. Switch to flecainide 75 mg q12h. If no recurrence by next visit, may reduce it to PRN dosing.\par - BP diary at home. Education provided.\par - RTC in 3 months.

## 2021-05-20 ENCOUNTER — RX RENEWAL (OUTPATIENT)
Age: 71
End: 2021-05-20

## 2021-07-19 ENCOUNTER — OUTPATIENT (OUTPATIENT)
Dept: OUTPATIENT SERVICES | Facility: HOSPITAL | Age: 71
LOS: 1 days | Discharge: HOME | End: 2021-07-19
Payer: MEDICARE

## 2021-07-19 DIAGNOSIS — I42.0 DILATED CARDIOMYOPATHY: ICD-10-CM

## 2021-07-19 PROCEDURE — 78452 HT MUSCLE IMAGE SPECT MULT: CPT | Mod: 26

## 2021-09-08 ENCOUNTER — RX RENEWAL (OUTPATIENT)
Age: 71
End: 2021-09-08

## 2021-09-24 ENCOUNTER — APPOINTMENT (OUTPATIENT)
Dept: CARDIOLOGY | Facility: CLINIC | Age: 71
End: 2021-09-24
Payer: MEDICARE

## 2021-09-24 VITALS
HEIGHT: 67 IN | OXYGEN SATURATION: 98 % | WEIGHT: 180 LBS | TEMPERATURE: 97.8 F | DIASTOLIC BLOOD PRESSURE: 80 MMHG | HEART RATE: 78 BPM | BODY MASS INDEX: 28.25 KG/M2 | SYSTOLIC BLOOD PRESSURE: 110 MMHG

## 2021-09-24 DIAGNOSIS — I83.93 ASYMPTOMATIC VARICOSE VEINS OF BILATERAL LOWER EXTREMITIES: ICD-10-CM

## 2021-09-24 DIAGNOSIS — I83.90 ASYMPTOMATIC VARICOSE VEINS OF UNSPECIFIED LOWER EXTREMITY: ICD-10-CM

## 2021-09-24 PROCEDURE — 99214 OFFICE O/P EST MOD 30 MIN: CPT

## 2021-09-24 RX ORDER — ALBUTEROL SULFATE 90 UG/1
108 (90 BASE) INHALANT RESPIRATORY (INHALATION)
Qty: 8 | Refills: 0 | Status: ACTIVE | COMMUNITY
Start: 2021-09-07

## 2021-09-24 RX ORDER — ERGOCALCIFEROL 1.25 MG/1
1.25 MG CAPSULE, LIQUID FILLED ORAL
Qty: 8 | Refills: 0 | Status: ACTIVE | COMMUNITY
Start: 2021-07-26

## 2021-09-24 RX ORDER — FUROSEMIDE 40 MG/1
40 TABLET ORAL DAILY
Qty: 30 | Refills: 3 | Status: DISCONTINUED | COMMUNITY
Start: 1900-01-01 | End: 2021-09-24

## 2021-09-24 NOTE — HISTORY OF PRESENT ILLNESS
[FreeTextEntry1] : 72 YO F with PMH of HTN, DLD, PAF h/o ELIO/DCCV on Eliquis, recent NST showed no ischemia, referred to Vascular clinic by her PMD (Dr. Chapin) for vascular calcifications and mild PAD. Pt. denies any symptoms of claudication, chest pain, sob, palpitations, orthopnea, pnd, syncope. \par \par Pt. also notes prominent varicose veins but denies any lower extremity pain, edema, or skin ulcers.\par

## 2021-09-24 NOTE — PHYSICAL EXAM
[General Appearance - Well Developed] : well developed [Normal Appearance] : normal appearance [Well Groomed] : well groomed [No Deformities] : no deformities [General Appearance - Well Nourished] : well nourished [General Appearance - In No Acute Distress] : no acute distress [Normal Conjunctiva] : the conjunctiva exhibited no abnormalities [Eyelids - No Xanthelasma] : the eyelids demonstrated no xanthelasmas [Normal Oral Mucosa] : normal oral mucosa [No Oral Pallor] : no oral pallor [No Oral Cyanosis] : no oral cyanosis [Normal Jugular Venous A Waves Present] : normal jugular venous A waves present [Normal Jugular Venous V Waves Present] : normal jugular venous V waves present [No Jugular Venous Duran A Waves] : no jugular venous duran A waves [Heart Sounds] : normal S1 and S2 [Heart Rate And Rhythm] : heart rate and rhythm were normal [Murmurs] : no murmurs present [Respiration, Rhythm And Depth] : normal respiratory rhythm and effort [Exaggerated Use Of Accessory Muscles For Inspiration] : no accessory muscle use [Auscultation Breath Sounds / Voice Sounds] : lungs were clear to auscultation bilaterally [Abdomen Soft] : soft [Abdomen Tenderness] : non-tender [Abnormal Walk] : normal gait [Abdomen Mass (___ Cm)] : no abdominal mass palpated [Gait - Sufficient For Exercise Testing] : the gait was sufficient for exercise testing [] : no rash [Skin Lesions] : no skin lesions [No Skin Ulcers] : no skin ulcer [No Xanthoma] : no  xanthoma was observed [Oriented To Time, Place, And Person] : oriented to person, place, and time [FreeTextEntry1] : prominent varicose veins of bilateral lower extremity.

## 2021-09-24 NOTE — ASSESSMENT
[FreeTextEntry1] : 72 YO F with PMH of HTN, DLD, PAF h/o ELIO/DCCV on Eliquis, recent NST showed no ischemia, referred to Vascular clinic by her PMD (Dr. Chapin) for vascular calcifications and mild PAD.\par \par Impression:\par \par Asymptomatic PAD \par Varicose veins, CEAP class 4a\par Paroxysmal A.Fib on Eliquis\par \par Recommend:\par -Aggressive risk factor modification.\par -Continue with Eliquis and statin.\par -No need for further vascular testing at this time.\par -F/U with EP and PMD.\par -F/U as needed.

## 2021-11-02 ENCOUNTER — TRANSCRIPTION ENCOUNTER (OUTPATIENT)
Age: 71
End: 2021-11-02

## 2021-11-17 ENCOUNTER — APPOINTMENT (OUTPATIENT)
Dept: CARDIOLOGY | Facility: CLINIC | Age: 71
End: 2021-11-17
Payer: MEDICARE

## 2021-11-17 VITALS
TEMPERATURE: 97.3 F | DIASTOLIC BLOOD PRESSURE: 80 MMHG | BODY MASS INDEX: 29.03 KG/M2 | OXYGEN SATURATION: 94 % | SYSTOLIC BLOOD PRESSURE: 110 MMHG | HEIGHT: 67 IN | WEIGHT: 185 LBS | RESPIRATION RATE: 16 BRPM | HEART RATE: 87 BPM

## 2021-11-17 PROCEDURE — 93000 ELECTROCARDIOGRAM COMPLETE: CPT

## 2021-11-17 PROCEDURE — 99214 OFFICE O/P EST MOD 30 MIN: CPT

## 2021-11-17 NOTE — HISTORY OF PRESENT ILLNESS
[FreeTextEntry1] : I had a pleasure of seeing Ms. MESA for follow-up consultation for atrial fibrillation and cardiomyopathy. \par \par Ms. MESA is a 70 year-year old female with history of cardiomyopathy- likely non-ischemic, persistent atrial fibrillation s/p DCCV, b/l small PE emboli incidentally detected on CTA admitted for shortness on exertion-found to have AF/HF. She was treated medically + DCCV.\par \par 5/5: Denies chest pain, shortness of breath, palpitation, dizziness or LOC.\par Feels significantly better.\par Didnt do Stress test because of confusion on her part and radiology dept.\par \par 11/17: Feels good. No symptoms. ? Occasional mild symptoms of palpitations-but short lasting.\par \par EKG (11/17): SR@87, , QRSd 90\par EKG: SR @ 59/min\par TTE (04/21): Nl EF, mild LAE\par MPI (07/21): Nl EF, no reversible defects \par Cardio: None\par

## 2021-11-17 NOTE — ASSESSMENT
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 On eliquis. compliant\par - On flecainide. off amiodarone. Remains in sinus clinically. May have episodes with short duration, but remains not symptomatic.\par - Continue metoprolol- refill sent\par - Continue Losartan\par - Cardio referral\par - RTC in 3-6 months.

## 2021-12-07 ENCOUNTER — APPOINTMENT (OUTPATIENT)
Dept: CARDIOLOGY | Facility: CLINIC | Age: 71
End: 2021-12-07
Payer: MEDICARE

## 2021-12-07 ENCOUNTER — RESULT CHARGE (OUTPATIENT)
Age: 71
End: 2021-12-07

## 2021-12-07 VITALS
HEIGHT: 67 IN | DIASTOLIC BLOOD PRESSURE: 80 MMHG | SYSTOLIC BLOOD PRESSURE: 110 MMHG | WEIGHT: 182 LBS | BODY MASS INDEX: 28.56 KG/M2 | TEMPERATURE: 97.3 F | HEART RATE: 58 BPM

## 2021-12-07 PROCEDURE — 99205 OFFICE O/P NEW HI 60 MIN: CPT

## 2021-12-07 PROCEDURE — 93000 ELECTROCARDIOGRAM COMPLETE: CPT

## 2021-12-07 RX ORDER — PANTOPRAZOLE SODIUM 40 MG/1
40 TABLET, DELAYED RELEASE ORAL DAILY
Qty: 1 | Refills: 3 | Status: ACTIVE | COMMUNITY

## 2022-04-25 ENCOUNTER — RX RENEWAL (OUTPATIENT)
Age: 72
End: 2022-04-25

## 2022-05-18 ENCOUNTER — APPOINTMENT (OUTPATIENT)
Dept: CARDIOLOGY | Facility: CLINIC | Age: 72
End: 2022-05-18
Payer: MEDICARE

## 2022-05-18 VITALS
TEMPERATURE: 98.2 F | OXYGEN SATURATION: 94 % | WEIGHT: 180 LBS | HEIGHT: 67 IN | SYSTOLIC BLOOD PRESSURE: 110 MMHG | DIASTOLIC BLOOD PRESSURE: 80 MMHG | BODY MASS INDEX: 28.25 KG/M2 | HEART RATE: 62 BPM

## 2022-05-18 PROCEDURE — 93000 ELECTROCARDIOGRAM COMPLETE: CPT

## 2022-05-18 PROCEDURE — 99214 OFFICE O/P EST MOD 30 MIN: CPT

## 2022-06-01 NOTE — HISTORY OF PRESENT ILLNESS
[FreeTextEntry1] : I had a pleasure of seeing Ms. MESA for follow-up consultation for atrial fibrillation and cardiomyopathy. \par \par Ms. MESA is a 70 year-year old female with history of cardiomyopathy- likely non-ischemic, persistent atrial fibrillation s/p DCCV, b/l small PE emboli incidentally detected on CTA admitted for shortness on exertion-found to have AF/HF. She was treated medically + DCCV.\par \par 5/5: Denies chest pain, shortness of breath, palpitation, dizziness or LOC.\par Feels significantly better.\par Didnt do Stress test because of confusion on her part and radiology dept.\par \par 11/17: Feels good. No symptoms. ? Occasional mild symptoms of palpitations-but short lasting.\par 5/18: Feels fine. Episodes of Palpitations - short lasting.\par \par Denies chest pain, shortness of breath, palpitation, dizziness or LOC except noted above.\par \par EKG (5/18/22): SR@62\par EKG (11/17): SR@87, , QRSd 90\par EKG: SR @ 59/min\par TTE (04/21): Nl EF, mild LAE\par MPI (07/21): Nl EF, no reversible defects \par Cardio: Dr. Lyman\par

## 2022-06-01 NOTE — ASSESSMENT
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 On eliquis. compliant\par - On flecainide. off amiodarone. Remains in sinus clinically.Mild symptoms- prefers to continue with current mx\par - On GDMT\par - RTC in 6 months.

## 2022-06-13 ENCOUNTER — RX RENEWAL (OUTPATIENT)
Age: 72
End: 2022-06-13

## 2022-09-29 ENCOUNTER — APPOINTMENT (OUTPATIENT)
Dept: CARDIOLOGY | Facility: CLINIC | Age: 72
End: 2022-09-29

## 2022-09-29 VITALS
BODY MASS INDEX: 28.25 KG/M2 | HEIGHT: 67 IN | SYSTOLIC BLOOD PRESSURE: 106 MMHG | HEART RATE: 71 BPM | DIASTOLIC BLOOD PRESSURE: 69 MMHG | WEIGHT: 180 LBS

## 2022-09-29 PROCEDURE — 99214 OFFICE O/P EST MOD 30 MIN: CPT | Mod: 25

## 2022-09-29 PROCEDURE — 93000 ELECTROCARDIOGRAM COMPLETE: CPT

## 2022-10-05 ENCOUNTER — RX RENEWAL (OUTPATIENT)
Age: 72
End: 2022-10-05

## 2022-11-16 ENCOUNTER — APPOINTMENT (OUTPATIENT)
Dept: CARDIOLOGY | Facility: CLINIC | Age: 72
End: 2022-11-16

## 2022-11-16 VITALS
SYSTOLIC BLOOD PRESSURE: 132 MMHG | BODY MASS INDEX: 28.25 KG/M2 | HEIGHT: 67 IN | HEART RATE: 105 BPM | DIASTOLIC BLOOD PRESSURE: 76 MMHG | OXYGEN SATURATION: 96 % | WEIGHT: 180 LBS

## 2022-11-16 PROCEDURE — 99215 OFFICE O/P EST HI 40 MIN: CPT | Mod: 25

## 2022-11-16 PROCEDURE — 93000 ELECTROCARDIOGRAM COMPLETE: CPT

## 2022-11-16 NOTE — ASSESSMENT
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 On eliquis. compliant\par - On flecainide. off amiodarone. Remains in sinus clinically.\par - On GDMT\par - ?Etiology of tachycardia.\par - MCOT for 2 weeks\par - Labs including TSH\par - TTE to assess LVEF (?PVC)\par - Cardio f-up\par - RTC in 6 months.

## 2022-11-16 NOTE — HISTORY OF PRESENT ILLNESS
[FreeTextEntry1] : I had a pleasure of seeing Ms. MESA for follow-up consultation for atrial fibrillation and cardiomyopathy. \par \par Ms. MESA is a 70 year-year old female with history of cardiomyopathy- likely non-ischemic, persistent atrial fibrillation s/p DCCV, b/l small PE emboli incidentally detected on CTA admitted for shortness on exertion-found to have AF/HF. She was treated medically + DCCV.\par \par 5/5: Denies chest pain, shortness of breath, palpitation, dizziness or LOC.\par Feels significantly better.\par Didnt do Stress test because of confusion on her part and radiology dept.\par \par 11/17: Feels good. No symptoms. ? Occasional mild symptoms of palpitations-but short lasting.\par 5/18: Feels fine. Episodes of Palpitations - short lasting.\par 11/16: + Tachycardia when walking. For 1 month. \par \par Denies chest pain, shortness of breath, palpitation, dizziness or LOC except noted above.\par \par EKG (11/16): , + PVC\par EKG (5/18/22): SR@62\par EKG (11/17): SR@87, , QRSd 90\par EKG: SR @ 59/min\par TTE (04/21): Nl EF, mild LAE\par MPI (07/21): Nl EF, no reversible defects \par Cardio: Dr. Lyman\par

## 2022-11-17 LAB
ALBUMIN SERPL ELPH-MCNC: 4.8 G/DL
ALP BLD-CCNC: 99 U/L
ALT SERPL-CCNC: 14 U/L
ANION GAP SERPL CALC-SCNC: 16 MMOL/L
AST SERPL-CCNC: 14 U/L
BASOPHILS # BLD AUTO: 0.05 K/UL
BASOPHILS NFR BLD AUTO: 0.6 %
BILIRUB SERPL-MCNC: 0.5 MG/DL
BUN SERPL-MCNC: 17 MG/DL
CALCIUM SERPL-MCNC: 11.1 MG/DL
CHLORIDE SERPL-SCNC: 101 MMOL/L
CO2 SERPL-SCNC: 25 MMOL/L
CREAT SERPL-MCNC: 0.8 MG/DL
EGFR: 78 ML/MIN/1.73M2
EOSINOPHIL # BLD AUTO: 0.12 K/UL
EOSINOPHIL NFR BLD AUTO: 1.4 %
GLUCOSE SERPL-MCNC: 107 MG/DL
HCT VFR BLD CALC: 46.4 %
HGB BLD-MCNC: 15.1 G/DL
IMM GRANULOCYTES NFR BLD AUTO: 0.3 %
LYMPHOCYTES # BLD AUTO: 1.56 K/UL
LYMPHOCYTES NFR BLD AUTO: 18.1 %
MAGNESIUM SERPL-MCNC: 2 MG/DL
MAN DIFF?: NORMAL
MCHC RBC-ENTMCNC: 28.9 PG
MCHC RBC-ENTMCNC: 32.5 G/DL
MCV RBC AUTO: 88.9 FL
MONOCYTES # BLD AUTO: 0.71 K/UL
MONOCYTES NFR BLD AUTO: 8.2 %
NEUTROPHILS # BLD AUTO: 6.16 K/UL
NEUTROPHILS NFR BLD AUTO: 71.4 %
PLATELET # BLD AUTO: 301 K/UL
POTASSIUM SERPL-SCNC: 4.4 MMOL/L
PROT SERPL-MCNC: 7.3 G/DL
RBC # BLD: 5.22 M/UL
RBC # FLD: 13.8 %
SODIUM SERPL-SCNC: 142 MMOL/L
TSH SERPL-ACNC: 1.42 UIU/ML
WBC # FLD AUTO: 8.63 K/UL

## 2022-12-12 ENCOUNTER — APPOINTMENT (OUTPATIENT)
Dept: CARDIOLOGY | Facility: CLINIC | Age: 72
End: 2022-12-12

## 2022-12-12 PROCEDURE — 93306 TTE W/DOPPLER COMPLETE: CPT

## 2023-01-19 ENCOUNTER — APPOINTMENT (OUTPATIENT)
Dept: CARDIOLOGY | Facility: CLINIC | Age: 73
End: 2023-01-19

## 2023-01-19 ENCOUNTER — RESULT CHARGE (OUTPATIENT)
Age: 73
End: 2023-01-19

## 2023-01-19 ENCOUNTER — APPOINTMENT (OUTPATIENT)
Dept: CARDIOLOGY | Facility: CLINIC | Age: 73
End: 2023-01-19
Payer: MEDICARE

## 2023-01-19 VITALS
HEART RATE: 71 BPM | DIASTOLIC BLOOD PRESSURE: 62 MMHG | HEIGHT: 67 IN | BODY MASS INDEX: 28.72 KG/M2 | WEIGHT: 183 LBS | SYSTOLIC BLOOD PRESSURE: 124 MMHG

## 2023-01-19 PROCEDURE — 99214 OFFICE O/P EST MOD 30 MIN: CPT | Mod: 25

## 2023-01-19 PROCEDURE — 93000 ELECTROCARDIOGRAM COMPLETE: CPT

## 2023-03-29 ENCOUNTER — OUTPATIENT (OUTPATIENT)
Dept: INPATIENT UNIT | Facility: HOSPITAL | Age: 73
LOS: 1 days | Discharge: HOME | End: 2023-03-29
Payer: MEDICARE

## 2023-03-29 VITALS
RESPIRATION RATE: 18 BRPM | SYSTOLIC BLOOD PRESSURE: 167 MMHG | WEIGHT: 184.97 LBS | HEIGHT: 67 IN | HEART RATE: 51 BPM | DIASTOLIC BLOOD PRESSURE: 81 MMHG | OXYGEN SATURATION: 96 % | TEMPERATURE: 98 F

## 2023-03-29 VITALS — RESPIRATION RATE: 17 BRPM | SYSTOLIC BLOOD PRESSURE: 155 MMHG | DIASTOLIC BLOOD PRESSURE: 70 MMHG | HEART RATE: 50 BPM

## 2023-03-29 DIAGNOSIS — H25.11 AGE-RELATED NUCLEAR CATARACT, RIGHT EYE: ICD-10-CM

## 2023-03-29 PROCEDURE — V2632: CPT

## 2023-03-29 NOTE — ASU PATIENT PROFILE, ADULT - FALL HARM RISK - UNIVERSAL INTERVENTIONS
Bed in lowest position, wheels locked, appropriate side rails in place/Call bell, personal items and telephone in reach/Instruct patient to call for assistance before getting out of bed or chair/Non-slip footwear when patient is out of bed/[place] to call system/Physically safe environment - no spills, clutter or unnecessary equipment/Purposeful Proactive Rounding/Room/bathroom lighting operational, light cord in reach
Patient/Caregiver provided printed discharge information.

## 2023-03-29 NOTE — ASU PATIENT PROFILE, ADULT - NS PRO ABUSE SCREEN AFRAID ANYONE YN
[FreeTextEntry1] : Satinder is a 24-year-old male who presents with congenital pes planus of both feet. His right foot is flatter than his left foot. He has no pain on a daily basis but pain with exercising. He is trying to get back into running shape and his feet have been bothersome to him. He is seeking possible orthotics for his shoes. His pain scale in the office today is a 0/10. He has no numbness or tingling in the foot and ankle. He does have a CT scan of his right foot which shows no coalition. No other complaints. no

## 2023-03-29 NOTE — ASU PATIENT PROFILE, ADULT - NSICDXPASTMEDICALHX_GEN_ALL_CORE_FT
PAST MEDICAL HISTORY:  Afib     GERD (gastroesophageal reflux disease)     High cholesterol     HTN (hypertension)

## 2023-03-31 DIAGNOSIS — Z79.01 LONG TERM (CURRENT) USE OF ANTICOAGULANTS: ICD-10-CM

## 2023-03-31 DIAGNOSIS — I48.91 UNSPECIFIED ATRIAL FIBRILLATION: ICD-10-CM

## 2023-03-31 DIAGNOSIS — H25.89 OTHER AGE-RELATED CATARACT: ICD-10-CM

## 2023-03-31 DIAGNOSIS — K21.9 GASTRO-ESOPHAGEAL REFLUX DISEASE WITHOUT ESOPHAGITIS: ICD-10-CM

## 2023-03-31 DIAGNOSIS — I10 ESSENTIAL (PRIMARY) HYPERTENSION: ICD-10-CM

## 2023-03-31 DIAGNOSIS — E78.00 PURE HYPERCHOLESTEROLEMIA, UNSPECIFIED: ICD-10-CM

## 2023-04-04 PROBLEM — I10 ESSENTIAL (PRIMARY) HYPERTENSION: Chronic | Status: ACTIVE | Noted: 2023-03-29

## 2023-04-04 PROBLEM — E78.00 PURE HYPERCHOLESTEROLEMIA, UNSPECIFIED: Chronic | Status: ACTIVE | Noted: 2023-03-29

## 2023-04-04 PROBLEM — K21.9 GASTRO-ESOPHAGEAL REFLUX DISEASE WITHOUT ESOPHAGITIS: Chronic | Status: ACTIVE | Noted: 2023-03-29

## 2023-05-17 ENCOUNTER — OUTPATIENT (OUTPATIENT)
Dept: INPATIENT UNIT | Facility: HOSPITAL | Age: 73
LOS: 1 days | Discharge: HOME | End: 2023-05-17
Payer: MEDICARE

## 2023-05-17 VITALS
WEIGHT: 179.9 LBS | DIASTOLIC BLOOD PRESSURE: 68 MMHG | OXYGEN SATURATION: 98 % | HEART RATE: 58 BPM | HEIGHT: 67 IN | RESPIRATION RATE: 16 BRPM | SYSTOLIC BLOOD PRESSURE: 165 MMHG | TEMPERATURE: 98 F

## 2023-05-17 VITALS — SYSTOLIC BLOOD PRESSURE: 140 MMHG | HEART RATE: 60 BPM | RESPIRATION RATE: 17 BRPM | DIASTOLIC BLOOD PRESSURE: 63 MMHG

## 2023-05-17 DIAGNOSIS — H25.812 COMBINED FORMS OF AGE-RELATED CATARACT, LEFT EYE: ICD-10-CM

## 2023-05-17 DIAGNOSIS — Z98.41 CATARACT EXTRACTION STATUS, RIGHT EYE: Chronic | ICD-10-CM

## 2023-05-17 PROCEDURE — V2632: CPT

## 2023-05-17 RX ORDER — FLECAINIDE ACETATE 50 MG
1 TABLET ORAL
Refills: 0 | DISCHARGE

## 2023-05-17 RX ORDER — PANTOPRAZOLE SODIUM 20 MG/1
1 TABLET, DELAYED RELEASE ORAL
Refills: 0 | DISCHARGE

## 2023-05-17 RX ORDER — LOSARTAN POTASSIUM 100 MG/1
1 TABLET, FILM COATED ORAL
Refills: 0 | DISCHARGE

## 2023-05-17 RX ORDER — APIXABAN 2.5 MG/1
5 TABLET, FILM COATED ORAL
Refills: 0 | DISCHARGE

## 2023-05-17 RX ORDER — METOPROLOL TARTRATE 50 MG
25 TABLET ORAL
Refills: 0 | DISCHARGE

## 2023-05-17 RX ORDER — ROSUVASTATIN CALCIUM 5 MG/1
1 TABLET ORAL
Refills: 0 | DISCHARGE

## 2023-05-17 NOTE — ASU PREOP CHECKLIST - NS PREOP CHK MONITOR ANESTHESIA CONSENT
done O-Z Flap Text: The defect edges were debeveled with a #15 scalpel blade.  Given the location of the defect, shape of the defect and the proximity to free margins an O-Z flap was deemed most appropriate.  Using a sterile surgical marker, an appropriate transposition flap was drawn incorporating the defect and placing the expected incisions within the relaxed skin tension lines where possible. The area thus outlined was incised deep to adipose tissue with a #15 scalpel blade.  The skin margins were undermined to an appropriate distance in all directions utilizing iris scissors.

## 2023-05-17 NOTE — ASU PREOP CHECKLIST - WEIGHT IN KG
The patient is Stable - Low risk of patient condition declining or worsening    Shift Goals  Clinical Goals: pain management  Patient Goals: pain control, rest    Progress made toward(s) clinical / shift goals:    Problem: Knowledge Deficit - Standard  Goal: Patient and family/care givers will demonstrate understanding of plan of care, disease process/condition, diagnostic tests and medications  Outcome: Progressing  Note: Patient understands the plan of care. Patient asks questions, all questions answered at this time      Problem: Fall Risk  Goal: Patient will remain free from falls  Outcome: Progressing  Note: Fall precautions in place       Patient is not progressing towards the following goals:      Problem: Pain - Standard  Goal: Alleviation of pain or a reduction in pain to the patient’s comfort goal  Outcome: Not Progressing  Note: Patient is still experiencing pain. New pain medications added to the MAR. Patient will be medicated per MAR. Will continue to monitor      81.6

## 2023-05-17 NOTE — ASU PATIENT PROFILE, ADULT - PRO INTERPRETER NEED 2
Problem: Patient Care Overview  Goal: Plan of Care/Patient Progress Review  Discharge Planner OT   Patient plan for discharge: None stated  Current status:  Pt completed supine to sit EOB with cues to sequence steps to complete supine to sit. Attempted x 4 to complete sit to stand with assist of 1, pt not able to achieve, pt sat EOB and participated  with grooming/hygiene task, after completion of ADL task pt was able to complete sit to stand with mod A of 2, cues with assist for walker mobilit/safety completed bed to chair transfer with mod A.   Barriers to return to prior living situation: Level of assist, cognition/alertness, pt lives alone without services  Recommendations for discharge: TCU per plan established by the Occupational Therapist  Rationale for recommendations: Pt would benefit from skilled services to maximize safety and IND in ADLs and IADLs to return to [facility].        Entered by: Ruthie Barba 05/28/2018 1:11 PM          English

## 2023-05-19 DIAGNOSIS — Z79.01 LONG TERM (CURRENT) USE OF ANTICOAGULANTS: ICD-10-CM

## 2023-05-19 DIAGNOSIS — E78.00 PURE HYPERCHOLESTEROLEMIA, UNSPECIFIED: ICD-10-CM

## 2023-05-19 DIAGNOSIS — I48.91 UNSPECIFIED ATRIAL FIBRILLATION: ICD-10-CM

## 2023-05-19 DIAGNOSIS — H25.89 OTHER AGE-RELATED CATARACT: ICD-10-CM

## 2023-05-19 DIAGNOSIS — K21.9 GASTRO-ESOPHAGEAL REFLUX DISEASE WITHOUT ESOPHAGITIS: ICD-10-CM

## 2023-05-19 DIAGNOSIS — Z98.41 CATARACT EXTRACTION STATUS, RIGHT EYE: ICD-10-CM

## 2023-05-19 DIAGNOSIS — I10 ESSENTIAL (PRIMARY) HYPERTENSION: ICD-10-CM

## 2023-05-24 ENCOUNTER — APPOINTMENT (OUTPATIENT)
Dept: ELECTROPHYSIOLOGY | Facility: CLINIC | Age: 73
End: 2023-05-24
Payer: MEDICARE

## 2023-05-24 VITALS
SYSTOLIC BLOOD PRESSURE: 130 MMHG | BODY MASS INDEX: 28.25 KG/M2 | HEIGHT: 67 IN | DIASTOLIC BLOOD PRESSURE: 78 MMHG | WEIGHT: 180 LBS | HEART RATE: 60 BPM | OXYGEN SATURATION: 96 %

## 2023-05-24 PROCEDURE — 93000 ELECTROCARDIOGRAM COMPLETE: CPT

## 2023-05-24 PROCEDURE — 99215 OFFICE O/P EST HI 40 MIN: CPT | Mod: 25

## 2023-05-24 NOTE — HISTORY OF PRESENT ILLNESS
[FreeTextEntry1] : I had a pleasure of seeing Ms. MESA for follow-up consultation for atrial fibrillation and cardiomyopathy. \par \par Ms. MESA is a 70 year-year old female with history of cardiomyopathy- likely non-ischemic, persistent atrial fibrillation s/p DCCV, b/l small PE emboli incidentally detected on CTA admitted for shortness on exertion-found to have AF/HF. She was treated medically + DCCV.\par \par 5/5: Denies chest pain, shortness of breath, palpitation, dizziness or LOC.\par Feels significantly better.\par Didnt do Stress test because of confusion on her part and radiology dept.\par \par 11/17: Feels good. No symptoms. ? Occasional mild symptoms of palpitations- but short lasting.\par 5/18: Feels fine. Episodes of Palpitations - short lasting.\par 11/16: + Tachycardia when walking. For 1 month. \par 5/24/23: feels fine. No further episode.\par \par Denies chest pain, shortness of breath, palpitation, dizziness or LOC except noted above.\par \par  EKG (5/24/23) SR at 60, , QRS 96.\par EKG (11/16): , + PVC\par EKG (5/18/22): SR@62\par EKG (11/17): SR@87, , QRSd 90\par EKG: SR @ 59/min\par TTE (04/21): Nl EF, mild LAE\par MPI (07/21): Nl EF, no reversible defects \par Cardio: Dr. Lyman --> Dr. Alfaro\par

## 2023-05-24 NOTE — ADDENDUM
[FreeTextEntry1] : IKenneth assisted in documentation on 05/24/2023 acting as a scribe for Dr. Odell Watt.\par

## 2023-05-24 NOTE — ASSESSMENT
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 On eliquis. compliant\par - On flecainide. off amiodarone. Remains in sinus.\par - QRS 96. Unchanged. Last MPI in 2021.\par - On GDMT\par - MCOT reviewed. No further episodes of palpitation, no significant events.\par - Cardio f-up\par - RTC in 6 months.
Pediatrician/LIBRA Rogers

## 2023-06-06 RX ORDER — LOSARTAN POTASSIUM 50 MG/1
50 TABLET, FILM COATED ORAL DAILY
Qty: 90 | Refills: 3 | Status: ACTIVE | COMMUNITY
Start: 2021-01-28 | End: 1900-01-01

## 2023-08-17 ENCOUNTER — APPOINTMENT (OUTPATIENT)
Dept: CARDIOLOGY | Facility: CLINIC | Age: 73
End: 2023-08-17
Payer: MEDICARE

## 2023-08-17 VITALS
HEART RATE: 65 BPM | HEIGHT: 67 IN | BODY MASS INDEX: 27.47 KG/M2 | DIASTOLIC BLOOD PRESSURE: 60 MMHG | WEIGHT: 175 LBS | SYSTOLIC BLOOD PRESSURE: 110 MMHG

## 2023-08-17 PROCEDURE — 93000 ELECTROCARDIOGRAM COMPLETE: CPT

## 2023-08-17 PROCEDURE — 99213 OFFICE O/P EST LOW 20 MIN: CPT | Mod: 25

## 2023-08-20 NOTE — REVIEW OF SYSTEMS
[Convulsions] : no convulsions [Confusion] : no confusion was observed [Negative] : Gastrointestinal

## 2023-08-20 NOTE — ASSESSMENT
[FreeTextEntry1] : pAF - currently in sinus bradycardia - following  EP - continue eliquis and flecainide  History of Cardiomyopathy - EF now normal - nonischemic NST negative 7/2021 - cont BB and ACE - currently patient is asymptomatic stable  HTN - controlled on losartan 50mg daily - recommended daily monitoring of BPs at home, low Na diet, avoiding NSAIDS, and weight loss   HLD - cont statin will repeat lipid panel next visit  Heart healthy diet low in saturated fats and high in omega 3s discussed. Recommended 30-40 min of moderate intensity exercise 3-4 times per week as per the AHA for cardiovascular risk reduction.    6 mo follow up

## 2023-08-20 NOTE — HISTORY OF PRESENT ILLNESS
[FreeTextEntry1] : I have the pleasure to see Ms Herndon today for cardiology  follow up. She used to see Dr Lyman. she is 73 yo F with a Pmhx of cardiomyopathy- likely non-ischemic, paroxysmal atrial fibrillation s/p DCCV on ELiquis, b/l small PE emboli incidentally detected on CTA, HTN and HLD and varicose veins .  Blood pressures controlled. Pt currently in sinus rhythm on Flecainde Following EP.  Nuclear stress test done in July 2021 and neg for ischemia/infarct Echo done recently 12/22 showed normal EF no significant valvular disease.  Patient is currently stable denies chest pain , shortness of breath , maya or palpitations EKG showed normal sinus rhythm no ischemic changes BP stable

## 2023-08-20 NOTE — PHYSICAL EXAM
[No Acute Distress] : no acute distress [Normal] : soft, non-tender, no masses/organomegaly, normal bowel sounds [Normal Gait] : normal gait [No Edema] : no edema [No Cyanosis] : no cyanosis [No Clubbing] : no clubbing [Moves all extremities] : moves all extremities [Alert and Oriented] : alert and oriented

## 2023-10-17 RX ORDER — METOPROLOL TARTRATE 25 MG/1
25 TABLET, FILM COATED ORAL
Qty: 180 | Refills: 3 | Status: ACTIVE | COMMUNITY
Start: 2021-02-04 | End: 1900-01-01

## 2023-10-17 RX ORDER — APIXABAN 5 MG/1
5 TABLET, FILM COATED ORAL
Qty: 180 | Refills: 3 | Status: ACTIVE | COMMUNITY
Start: 2021-09-08 | End: 1900-01-01

## 2023-11-29 ENCOUNTER — APPOINTMENT (OUTPATIENT)
Dept: ELECTROPHYSIOLOGY | Facility: CLINIC | Age: 73
End: 2023-11-29
Payer: MEDICARE

## 2023-11-29 VITALS
HEIGHT: 67 IN | BODY MASS INDEX: 28.25 KG/M2 | DIASTOLIC BLOOD PRESSURE: 76 MMHG | WEIGHT: 180 LBS | SYSTOLIC BLOOD PRESSURE: 120 MMHG | HEART RATE: 59 BPM

## 2023-11-29 PROCEDURE — 99214 OFFICE O/P EST MOD 30 MIN: CPT | Mod: 25

## 2023-11-29 PROCEDURE — 93000 ELECTROCARDIOGRAM COMPLETE: CPT

## 2024-02-15 ENCOUNTER — APPOINTMENT (OUTPATIENT)
Dept: CARDIOLOGY | Facility: CLINIC | Age: 74
End: 2024-02-15
Payer: MEDICARE

## 2024-02-15 VITALS
WEIGHT: 169 LBS | SYSTOLIC BLOOD PRESSURE: 124 MMHG | HEART RATE: 105 BPM | DIASTOLIC BLOOD PRESSURE: 70 MMHG | HEIGHT: 67 IN | BODY MASS INDEX: 26.53 KG/M2

## 2024-02-15 DIAGNOSIS — I48.0 PAROXYSMAL ATRIAL FIBRILLATION: ICD-10-CM

## 2024-02-15 DIAGNOSIS — E78.5 HYPERLIPIDEMIA, UNSPECIFIED: ICD-10-CM

## 2024-02-15 DIAGNOSIS — I42.0 DILATED CARDIOMYOPATHY: ICD-10-CM

## 2024-02-15 PROCEDURE — 93000 ELECTROCARDIOGRAM COMPLETE: CPT

## 2024-02-15 PROCEDURE — 99214 OFFICE O/P EST MOD 30 MIN: CPT | Mod: 25

## 2024-02-15 NOTE — HISTORY OF PRESENT ILLNESS
[FreeTextEntry1] : I have the pleasure to see Ms Herndon today for cardiology follow up. she is 71 yo F with a Pmhx of cardiomyopathy- likely non-ischemic, paroxysmal atrial fibrillation s/p DCCV on ELiquis, b/l small PE emboli incidentally detected on CTA, HTN and HLD and varicose veins .  Blood pressures controlled. Pt currently in sinus rhythm on Flecainde Following EP. Nuclear stress test done in July 2021 and neg for ischemia/infarct Echo done recently 12/22 showed normal EF no significant valvular disease.  Patient is currently stable denies chest pain , shortness of breath , maya or palpitations EKG showed normal sinus rhythm no ischemic changes HR ~ 100 today  she was drinking more coffee and tea lately  BP stable

## 2024-02-15 NOTE — REVIEW OF SYSTEMS
[Fever] : no fever [Chills] : no chills [Convulsions] : no convulsions [Confusion] : no confusion was observed [Negative] : Gastrointestinal

## 2024-02-15 NOTE — ASSESSMENT
[FreeTextEntry1] : pAF - currently in sinus rhythm HR ~ 100  - following EP - continue eliquis and flecainide and metoprolol  -patient advised to decrease caffeine intake and if new symptoms or palpitations or HR increased to call for medical advise   History of Cardiomyopathy - nonischemic NST negative 7/2021 - cont BB and ACE - currently patient is asymptomatic stable will repeat echo prior to next visit   HTN - controlled on losartan 50mg daily - recommended daily monitoring of BPs at home, low Na diet, avoiding NSAIDS, and weight loss   HLD - cont statin LDL controlled 72  Heart healthy diet low in saturated fats and high in omega 3s discussed. Recommended 30-40 min of moderate intensity exercise 3-4 times per week as per the AHA for cardiovascular risk reduction.    6 mo follow up.

## 2024-03-11 ENCOUNTER — RX RENEWAL (OUTPATIENT)
Age: 74
End: 2024-03-11

## 2024-03-11 RX ORDER — ROSUVASTATIN CALCIUM 5 MG/1
5 TABLET, FILM COATED ORAL
Qty: 90 | Refills: 3 | Status: ACTIVE | COMMUNITY
Start: 2021-07-26 | End: 1900-01-01

## 2024-05-29 ENCOUNTER — APPOINTMENT (OUTPATIENT)
Dept: ELECTROPHYSIOLOGY | Facility: CLINIC | Age: 74
End: 2024-05-29
Payer: MEDICARE

## 2024-05-29 VITALS
HEART RATE: 64 BPM | BODY MASS INDEX: 27.78 KG/M2 | WEIGHT: 177 LBS | HEIGHT: 67 IN | DIASTOLIC BLOOD PRESSURE: 80 MMHG | SYSTOLIC BLOOD PRESSURE: 130 MMHG

## 2024-05-29 PROCEDURE — 93000 ELECTROCARDIOGRAM COMPLETE: CPT

## 2024-05-29 PROCEDURE — G2211 COMPLEX E/M VISIT ADD ON: CPT

## 2024-05-29 PROCEDURE — 99214 OFFICE O/P EST MOD 30 MIN: CPT

## 2024-05-29 NOTE — HISTORY OF PRESENT ILLNESS
[FreeTextEntry1] : I had a pleasure of seeing Ms. MESA for follow-up consultation for atrial fibrillation and cardiomyopathy.   Ms. MESA is a 70 year-year old female with history of cardiomyopathy- likely non-ischemic, persistent atrial fibrillation s/p DCCV, b/l small PE emboli incidentally detected on CTA admitted for shortness on exertion-found to have AF/HF. She was treated medically + DCCV.  5/5: Denies chest pain, shortness of breath, palpitation, dizziness or LOC. Feels significantly better. Didnt do Stress test because of confusion on her part and radiology dept.  11/17: Feels good. No symptoms. ? Occasional mild symptoms of palpitations- but short lasting. 5/18: Feels fine. Episodes of Palpitations - short lasting. 11/16: + Tachycardia when walking. For 1 month.  5/24/23: feels fine. No further episode. 11/29/2023: Feels fine. No further episodes.   05/29/2024: Feels fine. Denies any major complaints.   Denies chest pain, shortness of breath, palpitation, dizziness or LOC except noted above.  EKG (05/29/2024): SR 64, , QRS 86, QTc 420, PVC EKG (11/29/2023): SR @ 59, , QRS 88, QTc 417   EKG (5/24/23) SR at 60, , QRS 96. EKG (11/16): , + PVC EKG (5/18/22): SR@62 EKG (11/17): SR@87, , QRSd 90 EKG: SR @ 59/min TTE (04/21): Nl EF, mild LAE MPI (07/21): Nl EF, no reversible defects  Cardio: Dr. Lyman --> Dr. Alfaro

## 2024-05-29 NOTE — ADDENDUM
[FreeTextEntry1] : Celia LOPEZ assisted in documentation on 05/29/2024 acting as a scribe for Dr. Odell Watt.

## 2024-05-29 NOTE — ASSESSMENT
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 On Eliquis. compliant - On flecainide. off amiodarone. Remains in sinus. - QRS 86. Unchanged. Last MPI in 2021. - On GDMT - Cardio f-up - RTC in1 year.

## 2024-06-12 RX ORDER — FLECAINIDE ACETATE 50 MG/1
50 TABLET ORAL
Qty: 270 | Refills: 3 | Status: ACTIVE | COMMUNITY
Start: 2021-05-05 | End: 1900-01-01

## 2024-07-18 ENCOUNTER — APPOINTMENT (OUTPATIENT)
Dept: CARDIOLOGY | Facility: CLINIC | Age: 74
End: 2024-07-18
Payer: MEDICARE

## 2024-07-18 DIAGNOSIS — I42.0 DILATED CARDIOMYOPATHY: ICD-10-CM

## 2024-07-18 DIAGNOSIS — I48.0 PAROXYSMAL ATRIAL FIBRILLATION: ICD-10-CM

## 2024-07-18 PROCEDURE — 93306 TTE W/DOPPLER COMPLETE: CPT

## 2024-08-15 ENCOUNTER — APPOINTMENT (OUTPATIENT)
Dept: CARDIOLOGY | Facility: CLINIC | Age: 74
End: 2024-08-15
Payer: MEDICARE

## 2024-08-15 VITALS
DIASTOLIC BLOOD PRESSURE: 60 MMHG | WEIGHT: 172 LBS | BODY MASS INDEX: 27 KG/M2 | SYSTOLIC BLOOD PRESSURE: 132 MMHG | HEART RATE: 59 BPM | HEIGHT: 67 IN

## 2024-08-15 DIAGNOSIS — I48.0 PAROXYSMAL ATRIAL FIBRILLATION: ICD-10-CM

## 2024-08-15 DIAGNOSIS — I42.0 DILATED CARDIOMYOPATHY: ICD-10-CM

## 2024-08-15 DIAGNOSIS — E78.5 HYPERLIPIDEMIA, UNSPECIFIED: ICD-10-CM

## 2024-08-15 PROCEDURE — 93000 ELECTROCARDIOGRAM COMPLETE: CPT

## 2024-08-15 PROCEDURE — 99214 OFFICE O/P EST MOD 30 MIN: CPT | Mod: 25

## 2024-08-16 NOTE — HISTORY OF PRESENT ILLNESS
[FreeTextEntry1] : I have the pleasure to see Ms Herndon today for cardiology follow up. she is 73 yo F with a Pmhx of cardiomyopathy- likely non-ischemic, paroxysmal atrial fibrillation s/p DCCV on ELiquis, b/l small PE emboli incidentally detected on CTA, HTN and HLD and varicose veins .  Blood pressures controlled. Pt currently in sinus rhythm on Flecainde Following EP. Nuclear stress test done in July 2021 and neg for ischemia/infarct Echo done recently 12/22 showed normal EF no significant valvular disease. repeated echo 7/2024 showed normal EF mild aortic dilatation 3.8 cm and mild LVH   Patient is currently stable denies chest pain , shortness of breath , maya or palpitations EKG showed normal sinus rhythm no ischemic changes HR controlled 60-70's  BP stable

## 2024-08-16 NOTE — ASSESSMENT
[FreeTextEntry1] : pAF - currently in sinus rhythm HR ~ 60-70 controlled  - appreciate EP follow up - continue eliquis  flecainide and metoprolol  -patient advised to decrease caffeine intake and if new symptoms or palpitations or HR increased to call for medical advise   History of Cardiomyopathy - nonischemic NST negative 7/2021 - cont BB and ACE - currently patient is asymptomatic stable repeated echo 7/2024 normal EF 62% mild LVH   HTN - controlled on losartan 50mg daily - recommended daily monitoring of BPs at home, low Na diet, avoiding NSAIDS, and weight loss   HLD - cont statin LDL controlled 72  Heart healthy diet low in saturated fats and high in omega 3s discussed. Recommended 30-40 min of moderate intensity exercise 3-4 times per week as per the AHA for cardiovascular risk reduction.    6 mo follow up.

## 2024-08-27 ENCOUNTER — RX RENEWAL (OUTPATIENT)
Age: 74
End: 2024-08-27

## 2025-02-12 ENCOUNTER — NON-APPOINTMENT (OUTPATIENT)
Age: 75
End: 2025-02-12

## 2025-02-12 ENCOUNTER — APPOINTMENT (OUTPATIENT)
Dept: CARDIOLOGY | Facility: CLINIC | Age: 75
End: 2025-02-12
Payer: MEDICARE

## 2025-02-12 VITALS
BODY MASS INDEX: 27.47 KG/M2 | WEIGHT: 175 LBS | SYSTOLIC BLOOD PRESSURE: 145 MMHG | HEART RATE: 56 BPM | DIASTOLIC BLOOD PRESSURE: 78 MMHG | HEIGHT: 67 IN

## 2025-02-12 DIAGNOSIS — I48.0 PAROXYSMAL ATRIAL FIBRILLATION: ICD-10-CM

## 2025-02-12 DIAGNOSIS — E11.9 TYPE 2 DIABETES MELLITUS W/OUT COMPLICATIONS: ICD-10-CM

## 2025-02-12 DIAGNOSIS — I42.0 DILATED CARDIOMYOPATHY: ICD-10-CM

## 2025-02-12 DIAGNOSIS — E78.5 HYPERLIPIDEMIA, UNSPECIFIED: ICD-10-CM

## 2025-02-12 PROCEDURE — 99214 OFFICE O/P EST MOD 30 MIN: CPT | Mod: 25

## 2025-02-12 PROCEDURE — 93000 ELECTROCARDIOGRAM COMPLETE: CPT

## 2025-02-12 RX ORDER — METFORMIN HYDROCHLORIDE 1000 MG/1
1000 TABLET, COATED ORAL
Qty: 2 | Refills: 0 | Status: ACTIVE | COMMUNITY
Start: 2025-02-12

## 2025-04-11 ENCOUNTER — RX RENEWAL (OUTPATIENT)
Age: 75
End: 2025-04-11

## 2025-04-23 NOTE — ASU PATIENT PROFILE, ADULT - BLOOD TRANSFUSION, PREVIOUS, PROFILE
Quality 111:Pneumonia Vaccination Status For Older Adults: Pneumococcal vaccine (PPSV23) administered on or after patient’s 60th birthday and before the end of the measurement period Quality 130: Documentation Of Current Medications In The Medical Record: Current Medications Documented Detail Level: Detailed Quality 47: Advance Care Plan: Advance Care Planning discussed and documented; advance care plan or surrogate decision maker documented in the medical record. Quality 110: Preventive Care And Screening: Influenza Immunization: Influenza Immunization previously received during influenza season no

## 2025-06-04 ENCOUNTER — APPOINTMENT (OUTPATIENT)
Dept: ELECTROPHYSIOLOGY | Facility: CLINIC | Age: 75
End: 2025-06-04
Payer: MEDICARE

## 2025-06-04 VITALS
SYSTOLIC BLOOD PRESSURE: 122 MMHG | BODY MASS INDEX: 27.47 KG/M2 | DIASTOLIC BLOOD PRESSURE: 62 MMHG | HEART RATE: 60 BPM | HEIGHT: 67 IN | WEIGHT: 175 LBS

## 2025-06-04 PROCEDURE — 99214 OFFICE O/P EST MOD 30 MIN: CPT | Mod: 25

## 2025-06-04 PROCEDURE — 93000 ELECTROCARDIOGRAM COMPLETE: CPT

## 2025-06-04 RX ORDER — FAMOTIDINE 40 MG/1
40 TABLET, FILM COATED ORAL EVERY OTHER DAY
Refills: 0 | Status: ACTIVE | COMMUNITY

## 2025-06-04 RX ORDER — METFORMIN HYDROCHLORIDE 850 MG/1
850 TABLET, COATED ORAL TWICE DAILY
Refills: 0 | Status: ACTIVE | COMMUNITY

## 2025-08-14 ENCOUNTER — RX RENEWAL (OUTPATIENT)
Age: 75
End: 2025-08-14

## 2025-08-20 ENCOUNTER — APPOINTMENT (OUTPATIENT)
Dept: CARDIOLOGY | Facility: CLINIC | Age: 75
End: 2025-08-20
Payer: MEDICARE

## 2025-08-20 ENCOUNTER — NON-APPOINTMENT (OUTPATIENT)
Age: 75
End: 2025-08-20

## 2025-08-20 ENCOUNTER — RESULT CHARGE (OUTPATIENT)
Age: 75
End: 2025-08-20

## 2025-08-20 VITALS
SYSTOLIC BLOOD PRESSURE: 110 MMHG | WEIGHT: 174 LBS | DIASTOLIC BLOOD PRESSURE: 60 MMHG | BODY MASS INDEX: 27.31 KG/M2 | HEART RATE: 62 BPM | HEIGHT: 67 IN

## 2025-08-20 DIAGNOSIS — I42.0 DILATED CARDIOMYOPATHY: ICD-10-CM

## 2025-08-20 DIAGNOSIS — E11.9 TYPE 2 DIABETES MELLITUS W/OUT COMPLICATIONS: ICD-10-CM

## 2025-08-20 DIAGNOSIS — E78.5 HYPERLIPIDEMIA, UNSPECIFIED: ICD-10-CM

## 2025-08-20 DIAGNOSIS — I48.0 PAROXYSMAL ATRIAL FIBRILLATION: ICD-10-CM

## 2025-08-20 PROCEDURE — 99204 OFFICE O/P NEW MOD 45 MIN: CPT | Mod: 25

## 2025-08-20 PROCEDURE — 93000 ELECTROCARDIOGRAM COMPLETE: CPT
